# Patient Record
Sex: FEMALE | URBAN - METROPOLITAN AREA
[De-identification: names, ages, dates, MRNs, and addresses within clinical notes are randomized per-mention and may not be internally consistent; named-entity substitution may affect disease eponyms.]

---

## 2023-01-24 NOTE — PROGRESS NOTES
Brian Beach is a 26 y.o. female presents for a new pregnancy visit. Chief Complaint   Patient presents with    Initial Prenatal Visit       Problems:  denies issues      No LMP recorded (lmp unknown). Patient is pregnant. IVF baby -thinks LMP may have been in 2022,and thinks they found out about positive pregnancy Aug 2022    Last Pap: pt states she is overdue    LMP history:  The date of her LMP is not certain. IVF baby-posible sometime 2022. A urine pregnancy test was positive 2022 . She was not on the pill at conception. Based on her LMP, her EDC is 2023 and her EGA is 28 weeks,6 days. Her menstrual cycles are regular and occur approximately every 28 days  and range from 3 to 5 days. Ultrasound data:  No ultrasound done here-transferred from [facility]    Pregnancy symptoms:    Since her LMP she has experienced  urinary frequency, breast tenderness, and nausea. She has not been vomiting over the last few weeks. Associated signs and symptoms which she denies: dysuria, discharge, vaginal bleeding. She states she has gained weight:  Approximately 23 pounds since first pregnant    Relevant past pregnancy history:   She has the following pregnancy history:     She has no history of  delivery. Relevant past medical history:(relevant to this pregnancy): noncontributory. 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 1. Have you been to the ER, urgent care clinic, or hospitalized since your last visit? No    2. Have you seen or consulted any other health care providers outside of the [address] [place] [address] since your last visit?  No    Examination chaperoned by Carol Ernst RN

## 2023-01-30 ENCOUNTER — INITIAL PRENATAL (OUTPATIENT)
Dept: OBGYN CLINIC | Age: 33
End: 2023-01-30
Payer: COMMERCIAL

## 2023-01-30 VITALS
WEIGHT: 191.4 LBS | BODY MASS INDEX: 31.89 KG/M2 | SYSTOLIC BLOOD PRESSURE: 144 MMHG | DIASTOLIC BLOOD PRESSURE: 83 MMHG | HEIGHT: 65 IN

## 2023-01-30 DIAGNOSIS — R01.1 HEART MURMUR: ICD-10-CM

## 2023-01-30 DIAGNOSIS — O09.519 SUPERVISION OF PRIMIGRAVIDA OF ADVANCED MATERNAL AGE, ANTEPARTUM: Primary | ICD-10-CM

## 2023-01-30 DIAGNOSIS — Z3A.28 28 WEEKS GESTATION OF PREGNANCY: ICD-10-CM

## 2023-01-30 DIAGNOSIS — E03.9 HYPOTHYROIDISM, UNSPECIFIED TYPE: ICD-10-CM

## 2023-01-30 PROCEDURE — 0501F PRENATAL FLOW SHEET: CPT | Performed by: OBSTETRICS & GYNECOLOGY

## 2023-01-30 RX ORDER — LEVOTHYROXINE SODIUM 75 UG/1
TABLET ORAL
COMMUNITY
End: 2023-02-01 | Stop reason: SDUPTHER

## 2023-01-30 RX ORDER — HYDROCORTISONE 25 MG/ML
LOTION TOPICAL 2 TIMES DAILY
COMMUNITY

## 2023-01-30 NOTE — PROGRESS NOTES
Arleen Spatz is a 26 y.o. female presents for a new pregnancy visit. No chief complaint on file. Problems: none    LMP:    Last Pap: see report obtained {Numbers; 1-4 :[id number]} year(s) ago. LMP history:  The date of her LMP is *** certain. Her last menstrual period was normal and lasted for 4 to 5 days. A urine pregnancy test was positive *** weeks ago. She was not on the pill at conception. Based on her LMP, her EDC is *** and her EGA is *** weeks,*** days. Her menstrual cycles are regular and occur approximately every 28 days  and range from 3 to 5 days. The last menses lasted *** the usual number of days. Ultrasound data:  She had an  ultrasound done by the ultrasound tech today which revealed a viable singh pregnancy with a gestational age of *** weeks and *** days giving an EDC of ***. Pregnancy symptoms:    Since her LMP she has experienced  urinary frequency, breast tenderness, and nausea. She {HAS/HAS NOT:} been vomiting over the last few weeks. Associated signs and symptoms which she denies: dysuria, discharge, vaginal bleeding. She states she has gained weight:  Approximately 5 pounds over the last few weeks. Relevant past pregnancy history:   She has the following pregnancy history: ***    She has no history of  delivery. Relevant past medical history:(relevant to this pregnancy): noncontributory. Her occupation is: ***.     1. Have you been to the ER, urgent care clinic, or hospitalized since your last visit? {Yes when where and reason for visit:}    2. Have you seen or consulted any other health care providers outside of the [address] [place] [address] since your last visit? {Yes when where and reason for visit:}    Examination chaperoned by Izzy Vences RN.

## 2023-01-30 NOTE — PROGRESS NOTES
Current pregnancy history:    Donna Barnard is a ,  26 y.o. female UNAVAILABLE No LMP recorded (lmp unknown). Patient is pregnant. .  She presents for the evaluation of amenorrhea and a positive pregnancy test.    Per nursing Note:    LMP history:  The date of her LMP is not certain. IVF baby-posible sometime 2022. A urine pregnancy test was positive 2022 . She was not on the pill at conception. Based on her IVF transfer her EDC is 2023 and her EGA is 28 weeks,4 days per IVF docs. IVF done in [place]. She brings 2 [facility] from [facility] one which is anatomy scan - normal.  She had PGD - normal male     Pregnancy symptoms:     Since her LMP she has experienced  urinary frequency, breast tenderness, and nausea. She has not been vomiting over the last few weeks. Associated signs and symptoms which she denies: dysuria, discharge, vaginal bleeding. She states she has gained weight:  Approximately 23 pounds since first pregnant     Relevant past pregnancy history:              She has the following pregnancy history:                She has no history of  delivery. Relevant past medical history:(relevant to this pregnancy): noncontributory. Her occupation is:  for [employer] agency. Substance history: negative for alcohol, tobacco and street drugs. Positive for nothing. Exposure history: There is/are indoor cat/s in the home. The patient was instructed to not change the cat litter. She denies close contact with children on a regular basis. She has had chicken pox or the vaccine in the past.   Patient denies issues with domestic violence. Genetic Screening/Teratology Counseling: (Includes patient, baby's father, or anyone in either family with:)  3.  Patient's age >/= 28 at [facility]?-- yes   2.   Thalassemia ([place], [place], [address], or  background): MCV<80?--no.     3.  Neural tube defect (meningomyelocele, spina bifida, anencephaly)?--no.   4.  Congenital heart defect?--no.  5.  Down syndrome?--no.   6.  Bandar-Sachs (Confucianist, [place])?--no.   7.  Canavan's Disease?--no.   8.  Familial Dysautonomia?--no.   9.  Sickle cell disease or trait ()? --no   The patient has not been tested for sickle trait  10. Hemophilia or other blood disorders?--no. 11.  Muscular dystrophy?--no. 12.  Cystic fibrosis?--no. 13.  [employer] Chorea?--no. 14.  Mental retardation/autism (if yes was person tested for Fragile X)?--no. 15.  Other inherited genetic or chromosomal disorder?--no. 12.  Maternal metabolic disorder (DM, PKU, etc)?--no. 17.  Patient or FOB with a child with a birth defect not listed above?--no.  17a. Patient or FOB with a birth defect themselves?--no. 18.  Recurrent pregnancy loss, or stillbirth?--no. 19.  Any medications since LMP other than prenatal vitamins (include vitamins, supplements, OTC meds, drugs, alcohol)?--no. 20.  Any other genetic/environmental exposure to discuss?--no. Infection History:  1. Lives with someone with TB or TB exposed?--no.   2.  Patient or partner has history of genital herpes?--no.  3.  Rash or viral illness since LMP?--no.    4.  History of STD (GC, CT, HPV, syphilis, HIV)? --CT distant  5. Other: OTHER? Past Medical History:   Diagnosis Date    Anxiety     Hashimoto's thyroiditis      History reviewed. No pertinent surgical history. Social History     Occupational History    Not on file   Tobacco Use    Smoking status: Never     Passive exposure: Never    Smokeless tobacco: Never   Substance and Sexual Activity    Alcohol use: Not Currently    Drug use: Never    Sexual activity: Yes     Partners: Male     Birth control/protection: None     History reviewed. No pertinent family history.   OB History    Para Term  AB Living   1 0           SAB IAB Ectopic Molar Multiple Live Births                    # Outcome Date GA Lbr Tom/2nd Weight Sex Delivery Anes PTL Lv   1 Current              No Known Allergies  Prior to Admission medications    Medication Sig Start Date End Date Taking? Authorizing Provider   prenatal 29/EVJH fum/folic/dha (PRENATAL-1 PO) Take  by mouth. Yes Provider, Historical   CHOLINE PO Take  by mouth. Yes Provider, Historical   docosahexaenoic acid/epa (FISH OIL PO) Take  by mouth. Yes Provider, Historical   L.acidophil/L.plantar/Bifido 7 (UP4 PROBIOTICS ADULT PO) Take  by mouth. Yes Provider, Historical   levothyroxine (Synthroid) 75 mcg tablet Take  by mouth Daily (before breakfast). Yes Provider, Historical   hydrocortisone (HYTONE) 2.5 % lotion Apply  to affected area two (2) times a day.  use thin layer   Yes Provider, Historical        Review of Systems: History obtained from the patient  Constitutional: negative for weight loss, fever, night sweats  HEENT: negative for hearing loss, earache, congestion, snoring, sore throat  CV: negative for chest pain, palpitations, edema  Resp: negative for cough, shortness of breath, wheezing  Breast: negative for breast lumps, nipple discharge, galactorrhea  GI: negative for change in bowel habits, abdominal pain, black or bloody stools  : negative for frequency, dysuria, hematuria, vaginal discharge  MSK: negative for back pain, joint pain, muscle pain  Skin: negative for itching, rash, hives  Neuro: negative for dizziness, headache, confusion, weakness  Psych: negative for anxiety, depression, change in mood  Heme/lymph: negative for bleeding, bruising, pallor    Objective:  Visit Vitals  BP (!) 144/83   Ht 5' 5\" (1.651 m)   Wt 191 lb 6.4 oz (86.8 kg)   LMP  (LMP Unknown) Comment: IVF baby   BMI 31.85 kg/m²       Physical Exam:     Constitutional  Appearance: well-nourished, well developed, alert, in no acute distress    HENT  Head  Face: appears normal  Eyes: appear normal  Ears: normal  Mouth: normal  Lips: no lesions    Neck  Inspection/Palpation: normal appearance, no masses or tenderness  Lymph Nodes: no lymphadenopathy present  Thyroid: gland size normal, nontender, no nodules or masses present on palpation    Chest  Respiratory Effort: breathing unlabored  Auscultation: normal breath sounds    Cardiovascular  Heart:   Auscultation: regular rate and rhythm without murmur    Breasts  Inspection of Breasts: breasts symmetrical, no skin changes, no discharge present, nipple appearance normal, no skin retraction present  Palpation of Breasts and Axillae: no masses present on palpation, no breast tenderness  Axillary Lymph Nodes: no lymphadenopathy present    Gastrointestinal  Abdominal Examination: abdomen non-tender to palpation, normal bowel sounds, no masses present  Liver and spleen: no hepatomegaly present, spleen not palpable  Hernias: no hernias identified    Genitourinary  External Genitalia: normal appearance for age, no discharge present, no tenderness present, no inflammatory lesions present, no masses present, no atrophy present  Vagina: normal vaginal vault without central or paravaginal defects, no discharge present, no inflammatory lesions present, no masses present  Bladder: non-tender to palpation  Urethra: appears normal  Cervix: normal   Uterus: enlarged, normal shape, soft  Adnexa: no adnexal tenderness present, no adnexal masses present  Perineum: perineum within normal limits, no evidence of trauma, no rashes or skin lesions present  Anus: anus within normal limits, no hemorrhoids present  Inguinal Lymph Nodes: no lymphadenopathy present    Skin  General Inspection: no rash, no lesions identified    Neurologic/Psychiatric  Mental Status:  Orientation: grossly oriented to person, place and time  Mood and Affect: mood normal, affect appropriate    Assessment:   Intrauterine pregnancy with the following problems identified:   EDC 4/20/2023 by transfer - angel HUERTA 33 at delivery  IVF pregnancy - PGD normal  Transfer 28 weeks from [place]  ? ASHANTI  LLP - MFM  Had covid vaccine  Flu vaccine disc  PCR  Had genetics preconceptually  Hypothyroidism - TSH on synthroid 0.75  Heart murmur - cardiology - had prior to pregnancy              Plan:     Offered CF testing, CVS, Nuchal Translucency, MSAFP, amnio, and discussed NIPT  Course of pregnancy discussed including visit schedule, routine U/S, glucola testing, etc.  Avoid alcoholic beverages and illicit/recreational drugs use  Take prenatal vitamins or folic acid daily. Hospital and practice style discussed with coverage system. Discussed nutrition, toxoplasmosis precautions, sexual activity, exercise, need for influenza vaccine, environmental and work hazards, travel advice, screen for domestic violence, need for seat belts. Discussed seafood, unpasteurized dairy products, deli meat, artificial sweeteners, and caffeine. Information on prenatal classes/breastfeeding given. Information on circumcision given  Patient encouraged not to smoke. Discussed current prescription drug use. Given medication list.  Discussed the use of over the counter medications and chemicals. Route of delivery discussed, including risks, benefits, and alternatives of  versus repeat LTCS. Pt understands risk of hemorrhage during pregnancy and post delivery and would accept blood products if necessary in life-threatening emergencies    See cardiology about heart murmur - had before pregnancy  See MFM - check placenta location  See me 1 week to recheck BP    Handouts given to pt.

## 2023-01-31 LAB
ALBUMIN SERPL-MCNC: 3 G/DL (ref 3.5–5)
ALBUMIN/GLOB SERPL: 1 (ref 1.1–2.2)
ALP SERPL-CCNC: 80 U/L (ref 45–117)
ALT SERPL-CCNC: 111 U/L (ref 12–78)
ANION GAP SERPL CALC-SCNC: 4 MMOL/L (ref 5–15)
AST SERPL-CCNC: 51 U/L (ref 15–37)
BILIRUB SERPL-MCNC: 0.3 MG/DL (ref 0.2–1)
BUN SERPL-MCNC: 9 MG/DL (ref 6–20)
BUN/CREAT SERPL: 17 (ref 12–20)
CALCIUM SERPL-MCNC: 8.7 MG/DL (ref 8.5–10.1)
CHLORIDE SERPL-SCNC: 109 MMOL/L (ref 97–108)
CO2 SERPL-SCNC: 24 MMOL/L (ref 21–32)
CREAT SERPL-MCNC: 0.53 MG/DL (ref 0.55–1.02)
GLOBULIN SER CALC-MCNC: 3 G/DL (ref 2–4)
GLUCOSE 1H P 100 G GLC PO SERPL-MCNC: 124 MG/DL (ref 65–140)
GLUCOSE SERPL-MCNC: 124 MG/DL (ref 65–100)
POTASSIUM SERPL-SCNC: 3.8 MMOL/L (ref 3.5–5.1)
PROT SERPL-MCNC: 6 G/DL (ref 6.4–8.2)
SODIUM SERPL-SCNC: 137 MMOL/L (ref 136–145)

## 2023-02-01 ENCOUNTER — TELEPHONE (OUTPATIENT)
Dept: OBGYN CLINIC | Age: 33
End: 2023-02-01

## 2023-02-01 LAB
ABO GROUP BLD: NORMAL
BACTERIA UR CULT: NORMAL
BLD GP AB SCN SERPL QL: NEGATIVE
C TRACH RRNA SPEC QL NAA+PROBE: NEGATIVE
CREAT UR-MCNC: 111 MG/DL
ERYTHROCYTE [DISTWIDTH] IN BLOOD BY AUTOMATED COUNT: 12 % (ref 11.7–15.4)
HBV SURFACE AG SERPL QL IA: NEGATIVE
HCT VFR BLD AUTO: 36 % (ref 34–46.6)
HCV AB S/CO SERPL IA: <0.1 S/CO RATIO (ref 0–0.9)
HGB BLD-MCNC: 12.2 G/DL (ref 11.1–15.9)
HIV 1+2 AB+HIV1 P24 AG SERPL QL IA: NON REACTIVE
MCH RBC QN AUTO: 31.9 PG (ref 26.6–33)
MCHC RBC AUTO-ENTMCNC: 33.9 G/DL (ref 31.5–35.7)
MCV RBC AUTO: 94 FL (ref 79–97)
N GONORRHOEA RRNA SPEC QL NAA+PROBE: NEGATIVE
PLATELET # BLD AUTO: 264 X10E3/UL (ref 150–450)
PROT UR-MCNC: 16.8 MG/DL
PROT/CREAT UR: 151 MG/G CREAT (ref 0–200)
RBC # BLD AUTO: 3.82 X10E6/UL (ref 3.77–5.28)
RH BLD: POSITIVE
RUBV IGG SERPL IA-ACNC: 1.86 INDEX
T VAGINALIS RRNA SPEC QL NAA+PROBE: NEGATIVE
TREPONEMA PALLIDUM IGG+IGM AB [PRESENCE] IN SERUM OR PLASMA BY IMMUNOASSAY: NON REACTIVE
TSH SERPL DL<=0.005 MIU/L-ACNC: 1.15 UIU/ML (ref 0.45–4.5)
WBC # BLD AUTO: 7.6 X10E3/UL (ref 3.4–10.8)

## 2023-02-01 RX ORDER — LEVOTHYROXINE SODIUM 75 UG/1
75 TABLET ORAL
Qty: 90 TABLET | Refills: 3 | Status: SHIPPED | OUTPATIENT
Start: 2023-02-01

## 2023-02-02 ENCOUNTER — PATIENT MESSAGE (OUTPATIENT)
Dept: OBGYN CLINIC | Age: 33
End: 2023-02-02

## 2023-02-03 LAB
CYTOLOGIST CVX/VAG CYTO: NORMAL
CYTOLOGY CVX/VAG DOC CYTO: NORMAL
CYTOLOGY CVX/VAG DOC THIN PREP: NORMAL
DX ICD CODE: NORMAL
HPV GENOTYPE REFLEX: NORMAL
HPV I/H RISK 4 DNA CVX QL PROBE+SIG AMP: NEGATIVE
Lab: NORMAL
OTHER STN SPEC: NORMAL
STAT OF ADQ CVX/VAG CYTO-IMP: NORMAL

## 2023-02-06 ENCOUNTER — ROUTINE PRENATAL (OUTPATIENT)
Dept: OBGYN CLINIC | Age: 33
End: 2023-02-06
Payer: COMMERCIAL

## 2023-02-06 VITALS — BODY MASS INDEX: 32.18 KG/M2 | WEIGHT: 193.4 LBS | SYSTOLIC BLOOD PRESSURE: 121 MMHG | DIASTOLIC BLOOD PRESSURE: 82 MMHG

## 2023-02-06 DIAGNOSIS — Z3A.29 29 WEEKS GESTATION OF PREGNANCY: ICD-10-CM

## 2023-02-06 DIAGNOSIS — Z23 ENCOUNTER FOR IMMUNIZATION: ICD-10-CM

## 2023-02-06 DIAGNOSIS — O09.519 SUPERVISION OF PRIMIGRAVIDA OF ADVANCED MATERNAL AGE, ANTEPARTUM: Primary | ICD-10-CM

## 2023-02-06 PROCEDURE — 90471 IMMUNIZATION ADMIN: CPT | Performed by: OBSTETRICS & GYNECOLOGY

## 2023-02-06 PROCEDURE — 0502F SUBSEQUENT PRENATAL CARE: CPT

## 2023-02-06 PROCEDURE — 90715 TDAP VACCINE 7 YRS/> IM: CPT

## 2023-02-06 NOTE — PROGRESS NOTES
BP better today  LFT slightly elevated at last visit - needs repeat but too late in the day - will do next week  Baby moving  tdap today

## 2023-02-09 ENCOUNTER — HOSPITAL ENCOUNTER (OUTPATIENT)
Dept: PERINATAL CARE | Age: 33
Discharge: HOME | End: 2023-02-09
Attending: OBSTETRICS & GYNECOLOGY
Payer: COMMERCIAL

## 2023-02-09 PROCEDURE — 76805 OB US >/= 14 WKS SNGL FETUS: CPT | Performed by: OBSTETRICS & GYNECOLOGY

## 2023-02-14 ENCOUNTER — ROUTINE PRENATAL (OUTPATIENT)
Dept: OBGYN CLINIC | Age: 33
End: 2023-02-14
Payer: COMMERCIAL

## 2023-02-14 VITALS — WEIGHT: 192 LBS | SYSTOLIC BLOOD PRESSURE: 101 MMHG | DIASTOLIC BLOOD PRESSURE: 70 MMHG | BODY MASS INDEX: 31.95 KG/M2

## 2023-02-14 DIAGNOSIS — Z3A.30 30 WEEKS GESTATION OF PREGNANCY: ICD-10-CM

## 2023-02-14 DIAGNOSIS — O09.519 SUPERVISION OF PRIMIGRAVIDA OF ADVANCED MATERNAL AGE, ANTEPARTUM: Primary | ICD-10-CM

## 2023-02-14 DIAGNOSIS — R79.89 ELEVATED LFTS: ICD-10-CM

## 2023-02-14 PROCEDURE — 0502F SUBSEQUENT PRENATAL CARE: CPT | Performed by: OBSTETRICS & GYNECOLOGY

## 2023-02-14 NOTE — PROGRESS NOTES
Placenta 1.8cm from internal os last week - needs to be at least 2cm  Baby moving  No bleeding   Advice given for GERD  Repeat labs today    Urine very concentrated

## 2023-02-14 NOTE — PROGRESS NOTES
EDC 4/20/2023 by transfer Sweetie Reid  AMA 33 at delivery  IVF pregnancy - PGD normal  Transfer 28 weeks from [place]  ? [facility]  Post previa at 36 weeks (1.8 cm)  Had covid vaccine  Flu vaccine  Had genetics preconceptually  Hypothyroidism - TSH on synthroid 0.75  TSH 1.150  Heart murmur - cardiology  PCR baseline . 15   LFT elevated - repeat next visit   TDAP administered 2/6/23  1 hour

## 2023-02-15 LAB
ALBUMIN SERPL-MCNC: 4 G/DL (ref 3.8–4.8)
ALBUMIN/GLOB SERPL: 2.2 {RATIO} (ref 1.2–2.2)
ALP SERPL-CCNC: 94 IU/L (ref 44–121)
ALT SERPL-CCNC: 45 IU/L (ref 0–32)
AST SERPL-CCNC: 26 IU/L (ref 0–40)
BILIRUB SERPL-MCNC: 0.2 MG/DL (ref 0–1.2)
BUN SERPL-MCNC: 10 MG/DL (ref 6–20)
BUN/CREAT SERPL: 18 (ref 9–23)
CALCIUM SERPL-MCNC: 9.4 MG/DL (ref 8.7–10.2)
CHLORIDE SERPL-SCNC: 102 MMOL/L (ref 96–106)
CO2 SERPL-SCNC: 21 MMOL/L (ref 20–29)
CREAT SERPL-MCNC: 0.57 MG/DL (ref 0.57–1)
EGFRCR SERPLBLD CKD-EPI 2021: 121 ML/MIN/1.73
GLOBULIN SER CALC-MCNC: 1.8 G/DL (ref 1.5–4.5)
GLUCOSE SERPL-MCNC: 80 MG/DL (ref 70–99)
POTASSIUM SERPL-SCNC: 4.1 MMOL/L (ref 3.5–5.2)
PROT SERPL-MCNC: 5.8 G/DL (ref 6–8.5)
SODIUM SERPL-SCNC: 137 MMOL/L (ref 134–144)

## 2023-02-22 ENCOUNTER — ROUTINE PRENATAL (OUTPATIENT)
Dept: OBGYN CLINIC | Age: 33
End: 2023-02-22
Payer: COMMERCIAL

## 2023-02-22 VITALS — WEIGHT: 197.4 LBS | BODY MASS INDEX: 32.85 KG/M2 | SYSTOLIC BLOOD PRESSURE: 124 MMHG | DIASTOLIC BLOOD PRESSURE: 81 MMHG

## 2023-02-22 DIAGNOSIS — O09.519 SUPERVISION OF PRIMIGRAVIDA OF ADVANCED MATERNAL AGE, ANTEPARTUM: Primary | ICD-10-CM

## 2023-02-22 DIAGNOSIS — O44.03 PLACENTA PREVIA IN THIRD TRIMESTER: ICD-10-CM

## 2023-02-22 DIAGNOSIS — Z3A.31 31 WEEKS GESTATION OF PREGNANCY: ICD-10-CM

## 2023-02-22 PROCEDURE — 0502F SUBSEQUENT PRENATAL CARE: CPT | Performed by: OBSTETRICS & GYNECOLOGY

## 2023-02-22 NOTE — PROGRESS NOTES
EDC 4/20/2023 by transfer Carmelina Tobias  AMA 33 at delivery  IVF pregnancy - PGD normal  Transfer 28 weeks from [place]  ? [facility]  Post previa at 36 weeks (1.8 cm)  Had covid vaccine  Flu vaccine  Had genetics preconceptually  Hypothyroidism - TSH on synthroid 0.75  TSH 1.150  Heart murmur - cardiology  PCR baseline . 15   LFT elevated - repeat next visit   TDAP administered 2/6/23  1 hour

## 2023-02-27 ENCOUNTER — OFFICE VISIT (OUTPATIENT)
Dept: CARDIOLOGY CLINIC | Age: 33
End: 2023-02-27

## 2023-02-27 ENCOUNTER — ROUTINE PRENATAL (OUTPATIENT)
Dept: OBGYN CLINIC | Age: 33
End: 2023-02-27

## 2023-02-27 ENCOUNTER — TELEPHONE (OUTPATIENT)
Dept: OBGYN CLINIC | Age: 33
End: 2023-02-27

## 2023-02-27 VITALS
BODY MASS INDEX: 33.32 KG/M2 | HEIGHT: 65 IN | OXYGEN SATURATION: 98 % | DIASTOLIC BLOOD PRESSURE: 80 MMHG | HEART RATE: 103 BPM | WEIGHT: 200 LBS | SYSTOLIC BLOOD PRESSURE: 132 MMHG

## 2023-02-27 DIAGNOSIS — R01.1 MURMUR, CARDIAC: ICD-10-CM

## 2023-02-27 DIAGNOSIS — Z76.89 ESTABLISHING CARE WITH NEW DOCTOR, ENCOUNTER FOR: Primary | ICD-10-CM

## 2023-02-27 DIAGNOSIS — Z3A.32 32 WEEKS GESTATION OF PREGNANCY: ICD-10-CM

## 2023-02-27 PROCEDURE — 99204 OFFICE O/P NEW MOD 45 MIN: CPT | Performed by: INTERNAL MEDICINE

## 2023-02-27 PROCEDURE — 93000 ELECTROCARDIOGRAM COMPLETE: CPT | Performed by: INTERNAL MEDICINE

## 2023-02-27 NOTE — PROGRESS NOTES
Reason to see patient: PreEclampsia    Referring: None    HPI: Harsha Galindo is a 26 y.o. female with past medical history significant for Hashimoto's thyroiditis is here. She is 32 weeks pregnant. On last few occasions she has had her blood pressure which was slightly elevated at her OB appointments. Her max blood pressure noted in 140 range. Last 3 readings in past 3 weeks have been fairly normal.  Out of concern she was asked to follow-up. Also she has been noticed to have a murmur. Currently has symptoms of shortness of breath on mild-to-moderate exertion. No symptoms of chest pain, rotations or lightheadedness or dizziness. Recently she started having symptoms of right upper quadrant abdominal pain for which she is going to have further evaluation today. No previous history of hypertension. Family history significant for father having coronary artery disease and myocardial infarction, mother has hypertension. EKG in my office today demonstrated mild sinus tachycardia with normal axis with short MS interval with normal ST segments and normal intervals. Lab results were personally reviewed. Plan:    1. Possible preeclampsia versus random episodes of slightly high blood pressure: At this time we will continue to monitor with weekly OB appointments and blood pressure checks. At this time I do not feel we need to give her any medications. 2.  Murmur: We will check echocardiogram.        ATTENTION:   This medical record was transcribed using an electronic medical records/speech recognition system. Although proofread, it may and can contain electronic, spelling and other errors. Corrections may be executed at a later time. Please feel free to contact us for any clarifications as needed. Past Medical History:   Diagnosis Date    Anxiety     Hashimoto's thyroiditis     Routine Papanicolaou smear 01/30/2023    NILM, HPV negative            No past surgical history on file. Family History   Problem Relation Age of Onset    Hypertension Mother     Other Father     Heart Attack Father     Other Maternal Grandmother     Other Maternal Grandfather     Stroke Maternal Grandfather     Heart Disease Maternal Grandfather     Diabetes Paternal Grandmother     Anxiety Paternal Grandmother     Other Paternal Grandfather     Cancer Paternal Grandfather            Social History     Socioeconomic History    Marital status:      Spouse name: Not on file    Number of children: Not on file    Years of education: Not on file    Highest education level: Not on file   Occupational History    Not on file   Tobacco Use    Smoking status: Never     Passive exposure: Never    Smokeless tobacco: Never   Substance and Sexual Activity    Alcohol use: Not Currently    Drug use: Never    Sexual activity: Yes     Partners: Male     Birth control/protection: None   Other Topics Concern    Not on file   Social History Narrative    Not on file     Social Determinants of Health     Financial Resource Strain: Not on file   Food Insecurity: Not on file   Transportation Needs: Not on file   Physical Activity: Not on file   Stress: Not on file   Social Connections: Not on file   Intimate Partner Violence: Not on file   Housing Stability: Not on file         No Known Allergies         Current Outpatient Medications   Medication Sig Dispense Refill    levothyroxine (Synthroid) 75 mcg tablet Take 1 Tablet by mouth Daily (before breakfast). 90 Tablet 3    CHOLINE PO Take  by mouth. docosahexaenoic acid/epa (FISH OIL PO) Take  by mouth. L.acidophil/L.plantar/Bifido 7 (UP4 PROBIOTICS ADULT PO) Take  by mouth. hydrocortisone (HYTONE) 2.5 % lotion Apply  to affected area two (2) times a day. use thin layer      prenatal vit-iron fumarate-fa 27 mg iron- 0.8 mg tab tablet Take 1 Tablet by mouth daily. 90 Tablet 5        ROS:  12 point review of systems was performed.  All negative except for HPI     Physical Exam:  Visit Vitals  /80 (BP 1 Location: Left upper arm, BP Patient Position: Sitting)   Pulse (!) 103   Ht 5' 5\" (1.651 m)   Wt 200 lb (90.7 kg)   LMP  (LMP Unknown) Comment: IVF baby   SpO2 98%   BMI 33.28 kg/m²       Gen:  Well-developed, well-nourished, in no acute distress  HEENT:  Pink conjunctivae, PERRL, hearing intact to voice, moist mucous membranes  Neck:  Supple, without masses, thyroid non-tender  Resp:  No accessory muscle use, clear breath sounds without wheezes rales or rhonchi  Card:  No murmurs, normal S1, S2 without thrills, bruits or peripheral edema  Abd:  Soft, non-tender, non-distended, normoactive bowel sounds are present, no palpable organomegaly and no detectable hernias  Lymph:  No cervical or inguinal adenopathy  Musc:  No cyanosis or clubbing  Skin:  No rashes or ulcers, skin turgor is good  Neuro:  Cranial nerves are grossly intact, no focal motor weakness, follows commands appropriately  Psych:  Good insight, oriented to person, place and time, alert     Labs:     Lab Results   Component Value Date/Time    WBC 7.6 01/30/2023 12:00 AM    HGB 12.2 01/30/2023 12:00 AM    HCT 36.0 01/30/2023 12:00 AM    PLATELET 130 87/44/8900 12:00 AM    MCV 94 01/30/2023 12:00 AM     Lab Results   Component Value Date/Time    Glucose 80 02/14/2023 12:00 AM    Creatinine 0.57 02/14/2023 12:00 AM      No results found for: CHOL, CHOLPOCT, HDL, LDL, LDLC, LDLCPOC, LDLCEXT, TRIGL, TGLPOCT, CHHD, CHHDX  Lab Results   Component Value Date/Time    ALT (SGPT) 45 (H) 02/14/2023 12:00 AM    Alk.  phosphatase 94 02/14/2023 12:00 AM    Bilirubin, total 0.2 02/14/2023 12:00 AM    Albumin 4.0 02/14/2023 12:00 AM    Protein, total 5.8 (L) 02/14/2023 12:00 AM    PLATELET 783 34/38/8953 12:00 AM     No results found for: INR, INREXT, PTMR, PTP, PT1, PT2, INREXT   Lab Results   Component Value Date/Time    Creatinine 0.57 02/14/2023 12:00 AM    BUN 10 02/14/2023 12:00 AM    Sodium 137 02/14/2023 12:00 AM    Potassium 4.1 02/14/2023 12:00 AM    Chloride 102 02/14/2023 12:00 AM    CO2 21 02/14/2023 12:00 AM     No results found for: PSA, PSA2, PSAR1, PSA1, PSAR2, PSA3, PSAR3, [id number], [id number]  Lab Results   Component Value Date/Time    TSH 1.150 01/30/2023 12:00 AM      Lab Results   Component Value Date/Time    Glucose 80 02/14/2023 12:00 AM      No results found for: CPK, RCK1, RCK2, RCK3, RCK4, CKMB, CKNDX, CKND1, TROPT, TROIQ, BNPP, BNP   No results found for: BNP, BNPP, BNPPPOC, XBNPT, BNPNT   Lab Results   Component Value Date/Time    Sodium 137 02/14/2023 12:00 AM    Potassium 4.1 02/14/2023 12:00 AM    Chloride 102 02/14/2023 12:00 AM    CO2 21 02/14/2023 12:00 AM    Anion gap 4 (L) 01/30/2023 11:21 AM    Glucose 80 02/14/2023 12:00 AM    BUN 10 02/14/2023 12:00 AM    Creatinine 0.57 02/14/2023 12:00 AM    BUN/Creatinine ratio 18 02/14/2023 12:00 AM    Calcium 9.4 02/14/2023 12:00 AM      Lab Results   Component Value Date/Time    Sodium 137 02/14/2023 12:00 AM    Potassium 4.1 02/14/2023 12:00 AM    Chloride 102 02/14/2023 12:00 AM    CO2 21 02/14/2023 12:00 AM    Anion gap 4 (L) 01/30/2023 11:21 AM    Glucose 80 02/14/2023 12:00 AM    BUN 10 02/14/2023 12:00 AM    Creatinine 0.57 02/14/2023 12:00 AM    BUN/Creatinine ratio 18 02/14/2023 12:00 AM    Calcium 9.4 02/14/2023 12:00 AM    Bilirubin, total 0.2 02/14/2023 12:00 AM    ALT (SGPT) 45 (H) 02/14/2023 12:00 AM    Alk. phosphatase 94 02/14/2023 12:00 AM    Protein, total 5.8 (L) 02/14/2023 12:00 AM    Albumin 4.0 02/14/2023 12:00 AM    Globulin 3.0 01/30/2023 11:21 AM    A-G Ratio 2.2 02/14/2023 12:00 AM      No results found for: HBA1C, QUE8KLJG, MDF9VQEJ, TDE8WGEX      No results for input(s): CPK, CKMB, TROIQ in the last 72 hours.     No lab exists for component: CKQMB, CPKMB        Problem List:     Problem List  Date Reviewed: 2/22/2023            Codes Class Noted    Supervision of primigravida of advanced maternal age, antepartum ICD-10-CM: O09.519  ICD-9-CM: V23.81  1/30/2023    Overview Addendum 2/22/2023  3:41 PM by Charisma Schaefer MD     [facility]  AMAYAKA 26 at delivery  IVF pregnancy - PGD normal  Transfer 28 weeks from [place]  ? [facility]  Post previa at 30 weeks (1.8 cm)  Had covid vaccine  Flu vaccine  Had genetics preconceptually  Hypothyroidism - TSH on synthroid 0.75  TSH 1.150  Heart murmur - cardiology  PCR baseline . 15   LFT elevated - repeat next visit   TDAP administered 2/6/23  1 hour                       Herbie Herrera MD, [facility]

## 2023-02-27 NOTE — TELEPHONE ENCOUNTER
Pt last seen in office 2/22/23 c/o having intense URQ pain. She believes it could be related to gallstones or gallbladder. She states she's on the way to her Cardiologist appt and wanted to know if she needed to stop by OB-GYN office to be seen. Denies having a personal hx but has fam hx of gallbladder issues. This MA s/w MD who states she needs to come by the office after her appt with Cardiologist, before 4pm.     Pt verbalized understanding.

## 2023-02-27 NOTE — PROGRESS NOTES
Spoke with pt to let her know Dr. Garo España was doing a delivery. Pt states she had wanted an ultrasound for what she thinks is gallstones. She has a routine OB appt this Friday and is going to stop at the desk to see if she can come tomorrow to talk with Dr. Garo España . She said she will head home and watch what she eats. Just noted that pt made appt for tomorrow 0800 am to be seen. Dr. Garo España made aware. Pt instructed to call on call MD if pain worsens,or has any vaginal bleeding,sudden gush of fluid etc or head to ER. Pt verbalized understanding and states she plans to go home and rest

## 2023-02-27 NOTE — PROGRESS NOTES
Chief Complaint   Patient presents with    Follow-up     palp     Vitals:    02/27/23 1449   BP: 132/80   BP 1 Location: Left upper arm   BP Patient Position: Sitting   Pulse: (!) 103   Height: 5' 5\" (1.651 m)   Weight: 200 lb (90.7 kg)   SpO2: 98%       Chest pain denied     SOB denied     Palpitations - yes     Swelling in hands/feet denied     Dizziness denied     Recent hospital stays denied     Refills denied

## 2023-02-28 ENCOUNTER — ROUTINE PRENATAL (OUTPATIENT)
Dept: OBGYN CLINIC | Age: 33
End: 2023-02-28
Payer: COMMERCIAL

## 2023-02-28 VITALS — DIASTOLIC BLOOD PRESSURE: 78 MMHG | SYSTOLIC BLOOD PRESSURE: 123 MMHG | WEIGHT: 196.4 LBS | BODY MASS INDEX: 32.68 KG/M2

## 2023-02-28 DIAGNOSIS — R10.11 RUQ PAIN: ICD-10-CM

## 2023-02-28 DIAGNOSIS — O09.519 SUPERVISION OF PRIMIGRAVIDA OF ADVANCED MATERNAL AGE, ANTEPARTUM: Primary | ICD-10-CM

## 2023-02-28 PROCEDURE — 0502F SUBSEQUENT PRENATAL CARE: CPT | Performed by: OBSTETRICS & GYNECOLOGY

## 2023-02-28 NOTE — PROGRESS NOTES
EDC 4/20/2023 by transfer Johnie Headhold  AMAYAKA 33 at delivery  IVF pregnancy - PGD normal  Transfer 28 weeks from [place]  ? CHTN  Post previa at 30 weeks (1.8 cm)  Had covid vaccine  Flu vaccine  Had genetics preconceptually  Hypothyroidism - TSH on synthroid 0.75  TSH 1.150  Heart murmur - cardiology  PCR baseline . 15   LFT elevated - repeat next visit   TDAP administered 2/6/23  1 hour

## 2023-02-28 NOTE — PROGRESS NOTES
EXTRA VISIT    Complains of RUQ discomfort, worse after eating big meal. Radiates to back  Baby moving    Area nontender to palpation    Note her LFT were mildly elevated a few weeks ago before all this started, then trended downward again  Repeat all labs with amylase, lipase  Needs RUQ US

## 2023-03-01 ENCOUNTER — HOSPITAL ENCOUNTER (OUTPATIENT)
Dept: ULTRASOUND IMAGING | Age: 33
Discharge: HOME | End: 2023-03-01
Attending: OBSTETRICS & GYNECOLOGY
Payer: COMMERCIAL

## 2023-03-01 DIAGNOSIS — R10.11 RUQ PAIN: ICD-10-CM

## 2023-03-01 DIAGNOSIS — O09.519 SUPERVISION OF PRIMIGRAVIDA OF ADVANCED MATERNAL AGE, ANTEPARTUM: ICD-10-CM

## 2023-03-01 LAB
ALBUMIN SERPL-MCNC: 2.8 G/DL (ref 3.5–5)
ALBUMIN/GLOB SERPL: 0.9 (ref 1.1–2.2)
ALP SERPL-CCNC: 103 U/L (ref 45–117)
ALT SERPL-CCNC: 43 U/L (ref 12–78)
AMYLASE SERPL-CCNC: 53 U/L (ref 25–115)
ANION GAP SERPL CALC-SCNC: 10 MMOL/L (ref 5–15)
AST SERPL-CCNC: 24 U/L (ref 15–37)
BILIRUB SERPL-MCNC: 0.3 MG/DL (ref 0.2–1)
BUN SERPL-MCNC: 7 MG/DL (ref 6–20)
BUN/CREAT SERPL: 13 (ref 12–20)
CALCIUM SERPL-MCNC: 9.1 MG/DL (ref 8.5–10.1)
CHLORIDE SERPL-SCNC: 107 MMOL/L (ref 97–108)
CO2 SERPL-SCNC: 24 MMOL/L (ref 21–32)
CREAT SERPL-MCNC: 0.56 MG/DL (ref 0.55–1.02)
ERYTHROCYTE [DISTWIDTH] IN BLOOD BY AUTOMATED COUNT: 13.3 % (ref 11.5–14.5)
GLOBULIN SER CALC-MCNC: 3 G/DL (ref 2–4)
GLUCOSE SERPL-MCNC: 75 MG/DL (ref 65–100)
HCT VFR BLD AUTO: 38.5 % (ref 35–47)
HGB BLD-MCNC: 12.2 G/DL (ref 11.5–16)
LIPASE SERPL-CCNC: 131 U/L (ref 73–393)
MCH RBC QN AUTO: 31.6 PG (ref 26–34)
MCHC RBC AUTO-ENTMCNC: 31.7 G/DL (ref 30–36.5)
MCV RBC AUTO: 99.7 FL (ref 80–99)
NRBC # BLD: 0 K/UL (ref 0–0.01)
NRBC BLD-RTO: 0 PER 100 WBC
PLATELET # BLD AUTO: 232 K/UL (ref 150–400)
PMV BLD AUTO: 10.1 FL (ref 8.9–12.9)
POTASSIUM SERPL-SCNC: 4.1 MMOL/L (ref 3.5–5.1)
PROT SERPL-MCNC: 5.8 G/DL (ref 6.4–8.2)
RBC # BLD AUTO: 3.86 M/UL (ref 3.8–5.2)
SODIUM SERPL-SCNC: 141 MMOL/L (ref 136–145)
WBC # BLD AUTO: 8.4 K/UL (ref 3.6–11)

## 2023-03-01 PROCEDURE — 76705 ECHO EXAM OF ABDOMEN: CPT

## 2023-03-14 ENCOUNTER — ROUTINE PRENATAL (OUTPATIENT)
Dept: OBGYN CLINIC | Age: 33
End: 2023-03-14
Payer: COMMERCIAL

## 2023-03-14 VITALS — DIASTOLIC BLOOD PRESSURE: 80 MMHG | WEIGHT: 195.4 LBS | SYSTOLIC BLOOD PRESSURE: 120 MMHG | BODY MASS INDEX: 32.52 KG/M2

## 2023-03-14 DIAGNOSIS — O09.519 SUPERVISION OF PRIMIGRAVIDA OF ADVANCED MATERNAL AGE, ANTEPARTUM: Primary | ICD-10-CM

## 2023-03-14 PROCEDURE — 0502F SUBSEQUENT PRENATAL CARE: CPT | Performed by: OBSTETRICS & GYNECOLOGY

## 2023-03-14 RX ORDER — FAMOTIDINE 20 MG/1
20 TABLET, FILM COATED ORAL 2 TIMES DAILY
COMMUNITY

## 2023-03-14 NOTE — PROGRESS NOTES
Baby moving  Still has pain RUQ - nearly constant.  Labs/RUQ US normal  Having sig issues with carpel tunnel - christopher on right - advised to see ortho hand    Fu 2 weeks  Has MFM fu next week

## 2023-03-14 NOTE — PROGRESS NOTES
EDC 4/20/2023 by transfer Sukh Hurst  AMAYAKA 33 at delivery  IVF pregnancy - PGD normal  Transfer 28 weeks from [place]  ? CHTN  Post previa at 30 weeks (1.8 cm)  Had covid vaccine  Flu vaccine  Had genetics preconceptually  Hypothyroidism - TSH on synthroid 0.75  TSH 1.150  Heart murmur - cardiology  PCR baseline . 15   LFT elevated - repeat next visit   TDAP administered 2/6/23  1 hour

## 2023-03-23 ENCOUNTER — HOSPITAL ENCOUNTER (OUTPATIENT)
Dept: PERINATAL CARE | Age: 33
Discharge: HOME | End: 2023-03-23
Attending: OBSTETRICS & GYNECOLOGY
Payer: COMMERCIAL

## 2023-03-23 PROCEDURE — 76816 OB US FOLLOW-UP PER FETUS: CPT | Performed by: OBSTETRICS & GYNECOLOGY

## 2023-03-23 PROCEDURE — 76817 TRANSVAGINAL US OBSTETRIC: CPT | Performed by: OBSTETRICS & GYNECOLOGY

## 2023-03-23 PROCEDURE — 76819 FETAL BIOPHYS PROFIL W/O NST: CPT | Performed by: OBSTETRICS & GYNECOLOGY

## 2023-03-24 NOTE — PROCEDURES
Indication  ========    Advanced maternal age  Pregnancy via IVF  Low lying placenta    Method  ======    Transabdominal ultrasound examination. View: Suboptimal view: limited by late gestational age    Pregnancy  =========    Augustine pregnancy. Number of fetuses: 1    Dating  ======    Prior assessment by: by IVF dating  GA by prior assessment 39 w + 0 d  TALI by prior assessment: 4/20/2023  Ultrasound examination on: 3/23/2023  GA by U/S based upon: [facility], BPD, Femur, HC  GA by U/S 40 w + 4 d  TALI by U/S: 4/9/2023  Assigned: based on stated TALI (by IVF dating), selected on 02/9/2023  Assigned GA 36 w + 0 d  Assigned TALI: 4/20/2023    Fetal Biometry  ============    Standard  BPD 92.7 mm 37w 5d 93% Hadlock  .3 mm -/- >99% Reece  .9 mm 39w 2d 90% Hadlock  .1 mm 37w 4d 93% Hadlock  Femur 69.9 mm 35w 6d 41% Hadlock  EFW 3,195 g 37w 6d 85% Hadlock  EFW (lb) 7 lb  EFW (oz) 1 oz  EFW by: Hadlock (BPD-HC-AC-FL)  Extended   5.4 mm  Other Structures   bpm    General Evaluation  ==============    Cardiac activity present.  bpm. Fetal movements: visualized. Presentation: cephalic  Placenta: posterior placental is 3.5 cm away from internal cervical os  Umbilical cord: 3 vessel cord  Amniotic fluid: MVP 5.9 cm. ANNETTA 12.2 cm.  Q1 5.9 cm, Q2 2.9 cm, Q3 3.4 cm, Q4 0.0 cm    Fetal Anatomy  ===========    Lateral ventricles: normal  Choroid plexus: normal  Midline falx: normal  Stomach: normal  Kidneys: normal  Bladder: normal  Fetal sex: female  Wants to know fetal sex: yes    Maternal Structures  ===============    Uterus / Cervix  Cervix: Visualized  Cervix details: normal  Approach: Transvaginal  Cervical length 4.62 cm  Second approach: Transabdominal    Biophysical Profile  ==============    2: Fetal breathing movements  2: Gross body movements  2: Fetal tone  2: Amniotic fluid volume  8/8 Biophysical profile score    Findings  =======    Follow up ultrasound (19734)    This is a augustine pregnancy in vertex position with biometry consistent with prior dating. Anatomy appears normal as noted above. Normal fluid and fetal movement are  noted. Growth is appropriate for gestational age with EFW = 3195g at 85% [facility] SCREVEN = 93%). Transvaginal ultrasound 99919    Transvaginal ultrasound was performed to assess placental location of which views were limited abdominally: posterior placenta is not low-lying at 3.5cm from the os    Biophysical Profile without NST (04844)    Please see biophysical profile score noted above. Fetal movement and fluid volume appear normal.    Plan of Care  ==========    This is an IVF pregnancy. Francia Doyle did not have a fetal echocardiogram performed during this pregnancy. We discussed the increased risk for congential cardiac  abnormalities in pregnancies conceived via IVF. I will refer Francia Doyle for a fetal echocardiogram.    The placenta should be more than 2 cm from the cervical os for a vaginal delivery. I will have Francia Doyle return in 6 weeks to evaluate placental location. Follow-up  ========    Weekly BPPs may be done at our office or yours    Coding  ======    Code: O09.513  Description: Supervision of elderly primigravida  Code: O200. 8XX  Description: Pregnancy via IVF  Code: Z03.72  Description: Encounter for suspected placental problem ruled out  Code: 15534  Description: Ultrasound, pregnant uterus, real time with image documentation, follow up, transabdominal approach per fetus  Code: 68447  Description: Fetal biophysical profile; without non-stress testing  Code: 35178  Description: Ultrasound, pregnant uterus, real time with image documentation, transvaginal

## 2023-03-29 ENCOUNTER — ROUTINE PRENATAL (OUTPATIENT)
Dept: OBGYN CLINIC | Age: 33
End: 2023-03-29
Payer: COMMERCIAL

## 2023-03-29 VITALS — BODY MASS INDEX: 32.82 KG/M2 | DIASTOLIC BLOOD PRESSURE: 83 MMHG | SYSTOLIC BLOOD PRESSURE: 119 MMHG | WEIGHT: 197.2 LBS

## 2023-03-29 DIAGNOSIS — Z36.85 ANTENATAL SCREENING FOR STREPTOCOCCUS B: ICD-10-CM

## 2023-03-29 DIAGNOSIS — Z3A.36 36 WEEKS GESTATION OF PREGNANCY: ICD-10-CM

## 2023-03-29 DIAGNOSIS — Z78.9 CONCEIVED BY IN VITRO FERTILIZATION: ICD-10-CM

## 2023-03-29 DIAGNOSIS — O09.519 SUPERVISION OF PRIMIGRAVIDA OF ADVANCED MATERNAL AGE, ANTEPARTUM: Primary | ICD-10-CM

## 2023-03-29 PROCEDURE — 0502F SUBSEQUENT PRENATAL CARE: CPT | Performed by: OBSTETRICS & GYNECOLOGY

## 2023-03-29 NOTE — PROGRESS NOTES
EDC 4/20/2023 by transfer Virginia No  AMA 33 at delivery  IVF pregnancy - PGD normal  Transfer 28 weeks from [place]  ? CHTN  Post previa at 30 weeks (1.8 cm)  Had covid vaccine  Flu vaccine  Had genetics preconceptually  Hypothyroidism - TSH on synthroid 0.75  TSH 1.150  Heart murmur - cardiology  PCR baseline . 15   LFT elevated - repeat next visit

## 2023-03-29 NOTE — PROGRESS NOTES
Placenta moved on US at [place] [address] - head measurements 90-93%, EFW 7-1  recommend weekly BPP due to hx of IVF    Cervix closed with vtx above SP - outlet very tight  Patient wants to post CS - she has been considering.  Can always cancel if head engages

## 2023-03-31 ENCOUNTER — ROUTINE PRENATAL (OUTPATIENT)
Dept: OBGYN CLINIC | Age: 33
End: 2023-03-31

## 2023-03-31 VITALS — DIASTOLIC BLOOD PRESSURE: 86 MMHG | SYSTOLIC BLOOD PRESSURE: 124 MMHG | BODY MASS INDEX: 32.68 KG/M2 | WEIGHT: 196.4 LBS

## 2023-03-31 DIAGNOSIS — Z3A.37 37 WEEKS GESTATION OF PREGNANCY: ICD-10-CM

## 2023-03-31 DIAGNOSIS — Z78.9 CONCEIVED BY IN VITRO FERTILIZATION: ICD-10-CM

## 2023-03-31 DIAGNOSIS — O09.519 SUPERVISION OF PRIMIGRAVIDA OF ADVANCED MATERNAL AGE, ANTEPARTUM: Primary | ICD-10-CM

## 2023-03-31 NOTE — PROGRESS NOTES
EDC 4/20/2023 by transfer Adria Ga  AMA 33 at delivery  IVF pregnancy - PGD normal  Transfer 28 weeks from [place]  ? CHTN  Post previa at 30 weeks (1.8 cm)  Had covid vaccine  Flu vaccine  Had genetics preconceptually  Hypothyroidism - TSH on synthroid 0.75  TSH 1.150  Heart murmur - cardiology  PCR baseline . 15   LFT elevated - repeat next visit   Primary C/S 4/14/23

## 2023-04-01 LAB — B-HEM STREP SPEC QL CULT: POSITIVE

## 2023-04-05 ENCOUNTER — TELEPHONE (OUTPATIENT)
Dept: OBGYN CLINIC | Age: 33
End: 2023-04-05

## 2023-04-05 NOTE — TELEPHONE ENCOUNTER
Two patient identifiers used    26year old patient  37w6d pregnant    Patient calling about her FMLA forms and was advised they were completed and faxed on 3/22/2023 to her disability company     Patient will contact the company to check on the status of the forms and and seen a my chart message to provider for further information about completion of the forms    Patient verbalized understanding.

## 2023-04-06 ENCOUNTER — ROUTINE PRENATAL (OUTPATIENT)
Dept: OBGYN CLINIC | Age: 33
End: 2023-04-06

## 2023-04-06 PROCEDURE — 0502F SUBSEQUENT PRENATAL CARE: CPT | Performed by: OBSTETRICS & GYNECOLOGY

## 2023-04-06 NOTE — PROGRESS NOTES
Problem List  Date Reviewed: 3/31/2023          Codes Class Noted    Murmur, cardiac ICD-10-CM: R01.1  ICD-9-CM: 785.2  2/27/2023        32 weeks gestation of pregnancy ICD-10-CM: Z3A.32  ICD-9-CM: V22.2  2/27/2023        Supervision of primigravida of advanced maternal age, antepartum ICD-10-CM: O09.519  ICD-9-CM: V23.81  1/30/2023    Overview Addendum 3/29/2023  1:41 PM by Claire Powell     [facility] 4/20/2023 by transfer [place]  AMAYAKA 33 at delivery  IVF pregnancy - PGD normal  Transfer 28 weeks from [place]  ? CHTN  Post previa at 30 weeks (1.8 cm)  Had covid vaccine  Flu vaccine  Had genetics preconceptually  Hypothyroidism - TSH on synthroid 0.75  TSH 1.150  Heart murmur - cardiology  PCR baseline . 15   LFT elevated - repeat next visit   Primary C/S 4/14/23

## 2023-04-06 NOTE — PROGRESS NOTES
BPP  A SINGLE VERTEX 38W0D IUP IS SEEN. FETAL CARDIAC MOTION OBSERVED. ANNETTA AND PLACENTA APPEAR WITHIN NORMAL LIMITS.   BPP SCORE: 8/8

## 2023-04-11 PROBLEM — O16.9 HYPERTENSION AFFECTING PREGNANCY: Status: ACTIVE | Noted: 2023-04-11

## 2023-04-18 ENCOUNTER — TELEPHONE (OUTPATIENT)
Dept: OBGYN CLINIC | Age: 33
End: 2023-04-18

## 2023-04-18 NOTE — TELEPHONE ENCOUNTER
Two patient identifiers used    26year old [address] patient that delivered by Walterine Viji on 4/13/2023    Patient calling to say that for the past 36 hours she has been having increasing pain when she urinates    Patient does not know if she has a temperature    Patient is on hour and 15 minutes from the office     Patient was advised to seek evaluation at her PCP or urgent care setting per work in  [place]      Patient verbalized understanding.

## 2024-04-24 RX ORDER — LEVOTHYROXINE SODIUM 0.07 MG/1
75 TABLET ORAL
Qty: 90 TABLET | Refills: 3 | OUTPATIENT
Start: 2024-04-24

## 2024-06-28 NOTE — PROGRESS NOTES
Lia May is a 34 y.o. female presents for a new pregnancy visit.    Chief Complaint   Patient presents with    Initial Prenatal Visit         No LMP recorded. Patient is pregnant.    Last Pap: 2023 normal/HPV neg    Conceived by 1 PGT- a euploid blastocyst at [place]. ANDREI 2025      Pregnancy symptoms:    Since her LMP she has experienced urinary frequency, breast tenderness, and nausea.   She has not been vomiting over the last few weeks.  Associated signs and symptoms which she denies: dysuria, discharge, vaginal bleeding.    She states she has gained weight:  Approximately 5 pounds over the last few weeks.    Relevant past pregnancy history:   She has the following pregnancy history:     She has no history of  delivery.    Relevant past medical history:(relevant to this pregnancy): noncontributory.      Her occupation is: works from home.       Examination chaperoned by Lia Jenkins MA.

## 2024-07-01 ENCOUNTER — TELEPHONE (OUTPATIENT)
Age: 34
End: 2024-07-01

## 2024-07-01 ENCOUNTER — ROUTINE PRENATAL (OUTPATIENT)
Age: 34
End: 2024-07-01

## 2024-07-01 VITALS
DIASTOLIC BLOOD PRESSURE: 85 MMHG | WEIGHT: 176.8 LBS | BODY MASS INDEX: 27.75 KG/M2 | SYSTOLIC BLOOD PRESSURE: 122 MMHG | HEIGHT: 67 IN

## 2024-07-01 DIAGNOSIS — O09.529 SUPERVISION OF ELDERLY MULTIGRAVIDA, ANTEPARTUM: Primary | ICD-10-CM

## 2024-07-01 PROBLEM — O09.519 SUPERVISION OF PRIMIGRAVIDA OF ADVANCED MATERNAL AGE, ANTEPARTUM: Status: RESOLVED | Noted: 2023-01-30 | Resolved: 2024-07-01

## 2024-07-01 LAB
ABO, EXTERNAL RESULT: NORMAL
C. TRACHOMATIS, EXTERNAL RESULT: NEGATIVE
HEP B, EXTERNAL RESULT: NEGATIVE
HEPATITIS C ANTIBODY, EXTERNAL RESULT: NON REACTIVE
HIV, EXTERNAL RESULT: NON REACTIVE
N. GONORRHOEAE, EXTERNAL RESULT: NEGATIVE
RH FACTOR, EXTERNAL RESULT: POSITIVE
RUBELLA TITER, EXTERNAL RESULT: NORMAL
T. PALLIDUM (SYPHILIS) ANTIBODY, EXTERNAL RESULT: NON REACTIVE

## 2024-07-01 PROCEDURE — 0501F PRENATAL FLOW SHEET: CPT | Performed by: OBSTETRICS & GYNECOLOGY

## 2024-07-01 RX ORDER — LEVOTHYROXINE SODIUM 0.1 MG/1
100 TABLET ORAL DAILY
COMMUNITY
Start: 2024-05-06

## 2024-07-01 RX ORDER — PNV NO.95/FERROUS FUM/FOLIC AC 28MG-0.8MG
1 TABLET ORAL DAILY
Qty: 90 TABLET | Refills: 5 | Status: SHIPPED | OUTPATIENT
Start: 2024-07-01

## 2024-07-01 SDOH — ECONOMIC STABILITY: INCOME INSECURITY: HOW HARD IS IT FOR YOU TO PAY FOR THE VERY BASICS LIKE FOOD, HOUSING, MEDICAL CARE, AND HEATING?: PATIENT DECLINED

## 2024-07-01 SDOH — ECONOMIC STABILITY: FOOD INSECURITY: WITHIN THE PAST 12 MONTHS, YOU WORRIED THAT YOUR FOOD WOULD RUN OUT BEFORE YOU GOT MONEY TO BUY MORE.: PATIENT DECLINED

## 2024-07-01 SDOH — ECONOMIC STABILITY: HOUSING INSECURITY
IN THE LAST 12 MONTHS, WAS THERE A TIME WHEN YOU DID NOT HAVE A STEADY PLACE TO SLEEP OR SLEPT IN A SHELTER (INCLUDING NOW)?: PATIENT DECLINED

## 2024-07-01 SDOH — ECONOMIC STABILITY: FOOD INSECURITY: WITHIN THE PAST 12 MONTHS, THE FOOD YOU BOUGHT JUST DIDN'T LAST AND YOU DIDN'T HAVE MONEY TO GET MORE.: PATIENT DECLINED

## 2024-07-01 ASSESSMENT — PATIENT HEALTH QUESTIONNAIRE - PHQ9
SUM OF ALL RESPONSES TO PHQ QUESTIONS 1-9: 0
1. LITTLE INTEREST OR PLEASURE IN DOING THINGS: NOT AT ALL
SUM OF ALL RESPONSES TO PHQ9 QUESTIONS 1 & 2: 0
SUM OF ALL RESPONSES TO PHQ QUESTIONS 1-9: 0
SUM OF ALL RESPONSES TO PHQ QUESTIONS 1-9: 0
2. FEELING DOWN, DEPRESSED OR HOPELESS: NOT AT ALL
SUM OF ALL RESPONSES TO PHQ QUESTIONS 1-9: 0

## 2024-07-01 NOTE — TELEPHONE ENCOUNTER
Received an referral to have pt seen with MFM. Scheduled pt for 09/3 at 1 for her ultrasound and a VV at 2 for GC. Called pt twice to confirm appt, no answer, left vm, sent mychart message to have pt give the office a call.

## 2024-07-01 NOTE — PROGRESS NOTES
Current pregnancy history:    Lia May is a ,  34 y.o. female White (non-) No LMP recorded. Patient is pregnant..  She presents for the evaluation of amenorrhea and a positive pregnancy test.    Per nursing Note:  Conceived by 1 PGT- a euploid blastocyst at [place]. ANDREI 2025        Pregnancy symptoms:     Since her LMP she has experienced urinary frequency, breast tenderness, and nausea.   She has not been vomiting over the last few weeks.  Associated signs and symptoms which she denies: dysuria, discharge, vaginal bleeding.     She states she has gained weight:  Approximately 5 pounds over the last few weeks.     Relevant past pregnancy history:              She has the following pregnancy history:                She has no history of  delivery.     Relevant past medical history:(relevant to this pregnancy): noncontributory.       Her occupation is: works from home.     Substance history: negative for alcohol, tobacco and street drugs.           Positive for nothing.  Exposure history: There is/are indoor cat/s in the home.  The patient was instructed to not change the cat litter.   She admits close contact with children on a regular basis.   She has had chicken pox or the vaccine in the past.   Patient denies issues with domestic violence.     Genetic Screening/Teratology Counseling: (Includes patient, baby's father, or anyone in either family with:)  1.  Patient's age >/= 35 at EDC?-- 35 at delivery   2.  Thalassemia (Wallisian, Greek, Mediterranean, or  background): MCV<80?--no.     3.  Neural tube defect (meningomyelocele, spina bifida, anencephaly)?--no.   4.  Congenital heart defect?--no.  5.  Down syndrome?--no.   6.  Robert-Sachs (Pentecostal, Mosotho Anita)?--no.   7.  Canavan's Disease?--no.   8.  Familial Dysautonomia?--no.   9.  Sickle cell disease or trait ()?--no   The patient has not been tested for sickle trait  10.  Hemophilia or other blood

## 2024-07-02 ENCOUNTER — TELEPHONE (OUTPATIENT)
Age: 34
End: 2024-07-02

## 2024-07-02 LAB
ABO GROUP BLD: NORMAL
ALBUMIN SERPL-MCNC: 4.2 G/DL (ref 3.9–4.9)
ALP SERPL-CCNC: 54 IU/L (ref 44–121)
ALT SERPL-CCNC: 16 IU/L (ref 0–32)
AST SERPL-CCNC: 15 IU/L (ref 0–40)
BASOPHILS # BLD AUTO: 0 X10E3/UL (ref 0–0.2)
BASOPHILS NFR BLD AUTO: 0 %
BILIRUB SERPL-MCNC: 0.2 MG/DL (ref 0–1.2)
BLD GP AB SCN SERPL QL: NEGATIVE
BUN SERPL-MCNC: 9 MG/DL (ref 6–20)
BUN/CREAT SERPL: 17 (ref 9–23)
CALCIUM SERPL-MCNC: 9.3 MG/DL (ref 8.7–10.2)
CHLORIDE SERPL-SCNC: 102 MMOL/L (ref 96–106)
CO2 SERPL-SCNC: 22 MMOL/L (ref 20–29)
CREAT SERPL-MCNC: 0.53 MG/DL (ref 0.57–1)
EGFRCR SERPLBLD CKD-EPI 2021: 123 ML/MIN/1.73
EOSINOPHIL # BLD AUTO: 0.1 X10E3/UL (ref 0–0.4)
EOSINOPHIL NFR BLD AUTO: 1 %
ERYTHROCYTE [DISTWIDTH] IN BLOOD BY AUTOMATED COUNT: 12.1 % (ref 11.7–15.4)
GLOBULIN SER CALC-MCNC: 2.3 G/DL (ref 1.5–4.5)
GLUCOSE SERPL-MCNC: 77 MG/DL (ref 70–99)
HBV SURFACE AG SERPL QL IA: NEGATIVE
HCT VFR BLD AUTO: 38 % (ref 34–46.6)
HCV IGG SERPL QL IA: NON REACTIVE
HGB BLD-MCNC: 12.7 G/DL (ref 11.1–15.9)
HIV 1+2 AB+HIV1 P24 AG SERPL QL IA: NON REACTIVE
IMM GRANULOCYTES # BLD AUTO: 0 X10E3/UL (ref 0–0.1)
IMM GRANULOCYTES NFR BLD AUTO: 0 %
LYMPHOCYTES # BLD AUTO: 2.4 X10E3/UL (ref 0.7–3.1)
LYMPHOCYTES NFR BLD AUTO: 30 %
MCH RBC QN AUTO: 31.2 PG (ref 26.6–33)
MCHC RBC AUTO-ENTMCNC: 33.4 G/DL (ref 31.5–35.7)
MCV RBC AUTO: 93 FL (ref 79–97)
MONOCYTES # BLD AUTO: 0.6 X10E3/UL (ref 0.1–0.9)
MONOCYTES NFR BLD AUTO: 8 %
NEUTROPHILS # BLD AUTO: 4.8 X10E3/UL (ref 1.4–7)
NEUTROPHILS NFR BLD AUTO: 61 %
PLATELET # BLD AUTO: 365 X10E3/UL (ref 150–450)
POTASSIUM SERPL-SCNC: 4.1 MMOL/L (ref 3.5–5.2)
PROT SERPL-MCNC: 6.5 G/DL (ref 6–8.5)
RBC # BLD AUTO: 4.07 X10E6/UL (ref 3.77–5.28)
RH BLD: POSITIVE
RUBV IGG SERPL IA-ACNC: 2.69 INDEX
SODIUM SERPL-SCNC: 137 MMOL/L (ref 134–144)
WBC # BLD AUTO: 7.9 X10E3/UL (ref 3.4–10.8)

## 2024-07-02 NOTE — TELEPHONE ENCOUNTER
PT name and  verified    35 yo last ov 24, next ov 24  , 11w2d    PT calling, was seen in the office yesterday , had relayed to MD then she had some brownish spotting when wiping yesterday, but today she states it is more pinkish and she is concerned.  PT denies any abdominal cramping and only when she wipes. PT has not had recent intercourse in past week. RN asked how hydration was and PT states she could be dehydrated, encouraged PT to drink water 8 glasses a day, at least.  PT is also wondering if stress could be causing this, as she has been really stressed with her job, especially the past 2 days.    Please advise  Thank you

## 2024-07-02 NOTE — TELEPHONE ENCOUNTER
Called pt to confirm appt. She stated that she was aware that Dr Holm was going to see us an referral but she is wanting to hold off scheduling the appointment until she is further along. Cancelled both appts

## 2024-07-02 NOTE — TELEPHONE ENCOUNTER
PT call returned, left generic vm, call office back at earliest convenience and to check MC for message.  PT sent MD's response to MC:  Roselia Holm MD         7/2/24 12:50 PM  Note     Very likely from exam yesterday. If enough  to soak pads let us know

## 2024-07-03 LAB
BACTERIA UR CULT: NORMAL
C TRACH RRNA SPEC QL NAA+PROBE: NEGATIVE
N GONORRHOEA RRNA SPEC QL NAA+PROBE: NEGATIVE
SPECIMEN STATUS REPORT: NORMAL
T VAGINALIS RRNA SPEC QL NAA+PROBE: NEGATIVE
TREPONEMA PALLIDUM IGG+IGM AB [PRESENCE] IN SERUM OR PLASMA BY IMMUNOASSAY: NON REACTIVE

## 2024-07-11 LAB
Lab: NORMAL
NTRA 1P36 DELETION SYNDROME POPULATION-BASED RISK TEXT: NORMAL
NTRA 1P36 DELETION SYNDROME RESULT TEXT: NORMAL
NTRA 1P36 DELETION SYNDROME RISK SCORE TEXT: NORMAL
NTRA 22Q11.2 DELETION SYNDROME POPULATION-BASED RISK TEXT: NORMAL
NTRA 22Q11.2 DELETION SYNDROME RESULT TEXT: NORMAL
NTRA 22Q11.2 DELETION SYNDROME RISK SCORE TEXT: NORMAL
NTRA ANGELMAN SYNDROME POPULATION-BASED RISK TEXT: NORMAL
NTRA ANGELMAN SYNDROME RESULT TEXT: NORMAL
NTRA ANGELMAN SYNDROME RISK SCORE TEXT: NORMAL
NTRA CRI-DU-CHAT SYNDROME POPULATION-BASED RISK TEXT: NORMAL
NTRA CRI-DU-CHAT SYNDROME RESULT TEXT: NORMAL
NTRA CRI-DU-CHAT SYNDROME RISK SCORE TEXT: NORMAL
NTRA FETAL FRACTION: NORMAL
NTRA FETAL RHD SUMMARY: NORMAL
NTRA GENDER OF FETUS: NORMAL
NTRA MONOSOMY X AGE-BASED RISK TEXT: NORMAL
NTRA MONOSOMY X RESULT TEXT: NORMAL
NTRA MONOSOMY X RISK SCORE TEXT: NORMAL
NTRA PRADER-WILLI SYNDROME POPULATION-BASED RISK TEXT: NORMAL
NTRA PRADER-WILLI SYNDROME RESULT TEXT: NORMAL
NTRA PRADER-WILLI SYNDROME RISK SCORE TEXT: NORMAL
NTRA TRIPLOIDY RESULT TEXT: NORMAL
NTRA TRISOMY 13 AGE-BASED RISK TEXT: NORMAL
NTRA TRISOMY 13 RESULT TEXT: NORMAL
NTRA TRISOMY 13 RISK SCORE TEXT: NORMAL
NTRA TRISOMY 18 AGE-BASED RISK TEXT: NORMAL
NTRA TRISOMY 18 RESULT TEXT: NORMAL
NTRA TRISOMY 18 RISK SCORE TEXT: NORMAL
NTRA TRISOMY 21 AGE-BASED RISK TEXT: NORMAL
NTRA TRISOMY 21 RESULT TEXT: NORMAL
NTRA TRISOMY 21 RISK SCORE TEXT: NORMAL

## 2024-08-09 ENCOUNTER — ROUTINE PRENATAL (OUTPATIENT)
Age: 34
End: 2024-08-09

## 2024-08-09 VITALS
BODY MASS INDEX: 27.88 KG/M2 | HEART RATE: 96 BPM | WEIGHT: 178 LBS | DIASTOLIC BLOOD PRESSURE: 85 MMHG | SYSTOLIC BLOOD PRESSURE: 119 MMHG

## 2024-08-09 DIAGNOSIS — Z36.3 ENCOUNTER FOR ROUTINE SCREENING FOR MALFORMATION USING ULTRASONICS: ICD-10-CM

## 2024-08-09 DIAGNOSIS — O09.529 SUPERVISION OF ELDERLY MULTIGRAVIDA, ANTEPARTUM: Primary | ICD-10-CM

## 2024-08-09 DIAGNOSIS — Z3A.16 16 WEEKS GESTATION OF PREGNANCY: ICD-10-CM

## 2024-08-09 DIAGNOSIS — E03.9 HYPOTHYROIDISM, UNSPECIFIED TYPE: ICD-10-CM

## 2024-08-09 DIAGNOSIS — Z36.9 UNSPECIFIED ANTENATAL SCREENING: ICD-10-CM

## 2024-08-09 LAB
CREAT UR-MCNC: 219 MG/DL
PROT UR-MCNC: 13 MG/DL (ref 0–11.9)
PROT/CREAT UR-RTO: 0.1
TSH SERPL DL<=0.05 MIU/L-ACNC: 1.05 UIU/ML (ref 0.36–3.74)

## 2024-08-09 NOTE — PROGRESS NOTES
Doing well  AFP today, check TSH  Needs baseline PCR      Disc needs fetal scan due to CELINA HAYES. Patient says it's $4k per scan and she declines. I explained standard of care is [facility] scan - they check for more things and see them better. She understands we may miss something on our basic anatomy scan but still declines to go to [facility] at this time.

## 2024-08-09 NOTE — PROGRESS NOTES
Intrauterine pregnancy with the following problems identified:   EDC 1/19/2025  IVF/FET   Euploid blastocyst  NIPTS- normal female  Carrier screening - FOB was negative  AMA 35 at delivery  Baby ASA  Hx of LTCS - repeat at term  CHTN  PCR  Hypothyroidism - check TSH next visit Syntroid 100mcg  Depression - declines medication at this time

## 2024-08-09 NOTE — PROGRESS NOTES
Intrauterine pregnancy with the following problems identified:   EDC 1/19/2025  IVF/FET   Euploid blastocyst  NIPTS- normal female  Carrier screening - FOB was negative  AMA 35 at delivery  Baby ASA  Hx of LTCS - repeat at term  CHTN  PCR  Hypothyroidism - check TSH next visit Syntroid 100mcg  Depression - declines medication at this time    Pt complains of high amounts of heartburn

## 2024-08-09 NOTE — PATIENT INSTRUCTIONS
Patient Education        Weeks 14 to 18 of Your Pregnancy: Care Instructions  Around this time, you may start to look pregnant. Your baby is now able to pass urine. And the first stool (meconium) is starting to collect in your baby's intestines. Hair is starting to grow on your baby's head.    You may notice some skin changes, such as itchy spots on your palms or acne on your face.   At your next doctor visit, you may have an ultrasound. So you might think about whether you want to know the sex of your baby. Also ask your doctor about flu and COVID-19 shots.      How to reduce stress   Ask for help when you need it.  Try to avoid things that cause you stress.  Seek out things that relieve stress, such as breathing exercises or yoga.     How to get exercise   If you don't usually exercise, start slowly. Short walks may be a good choice.  Try to be active 30 minutes a day, at least 5 days a week.  Avoid activities where you're more likely to fall.  Use light weights to reduce stress on your joints.     How to stay at a healthy weight for you   Talk to your doctor or midwife about how much weight you should gain.  It's generally best to gain:  About 28 to 40 pounds if you're underweight.  About 25 to 35 pounds if you're at a healthy weight.  About 15 to 25 pounds if you're overweight.  About 11 to 20 pounds if you're very overweight (obese).  Follow-up care is a key part of your treatment and safety. Be sure to make and go to all appointments, and call your doctor if you are having problems. It's also a good idea to know your test results and keep a list of the medicines you take.  Where can you learn more?  Go to [email or web address]/[email or web address] and enter I453 to learn more about \"Weeks 14 to 18 of Your Pregnancy: Care Instructions.\"  Current as of: July 10, 2023  Content Version: 14.1  © 8124-7248 Healthwise, Incorporated.   Care instructions adapted under license by [employer]. If you have questions about a

## 2024-08-11 LAB
AFP INTERP SERPL-IMP: NORMAL
AFP MOM SERPL: 1.56
AFP SERPL-MCNC: 48 NG/ML
AGE AT DELIVERY: 37 YR
AGE OF EGG DONOR: NORMAL
AGE OF EGG DONOR: NORMAL
COMMENT: NORMAL
DONOR EGG?: NO
DONOR EGG?: NORMAL
FAMILY HISTORY NTD: NORMAL
FHX NTD (Y OR N): NORMAL
GA METHOD: NORMAL
GA: 16.1 WEEKS
IDDM PATIENT QL: NO
INSULIN DEP. DIABETIC: NORMAL
Lab: 176
Lab: NORMAL
Lab: NORMAL
MAT SCN FOR FETAL ABNORMALITIES SERPL: NORMAL
MULTIPLE PREGNANCY: NO
NEURAL TUBE DEFECT RISK FETUS: 2331
NUMBER OF FETUSES: NO
OTHER INDICATIONS: NO
OTHER INDICATIONS: NORMAL
PREVIOUSLY ELEVATED AFP (Y OR N): 16
PREVIOUSLY ELEVATED AFP (Y OR N): NO
PRIOR 1ST TRIM TESTING ?: NO
PRIOR 2ND TRIM TESTING ?: NO
PRIOR DS/NTD SCREEN CURRENT PREGNANCY?: NO
PRIOR DS/NTD SCREEN CURRENT PREGNANCY?: NORMAL
PRIOR FIRST TRIMESTER TESTING (Y OR N ): NORMAL
PRIOR PREGNANCY WITH DOWN SYNDROME (Y OR N): 1
PRIOR PREGNANCY WITH DOWN SYNDROME (Y OR N): NO
TYPE OF EGG DONOR: NORMAL
TYPE OF EGG DONOR: NORMAL

## 2024-08-19 ENCOUNTER — TELEPHONE (OUTPATIENT)
Age: 34
End: 2024-08-19

## 2024-08-19 NOTE — TELEPHONE ENCOUNTER
PT name and  verified      33 yo last ov 24, next ov 24  , 18w1d          PT calling stating her son started on  with \"slapped\" cheek look and fever, which broke this am, thinking he has the Parvovirus.  Her son has not been tested yet, PT states not until tomorrow, and she is wanting to know if she can get tested, as she is worried about the pregnancy and if she has been exposed to it.  RN informed PT that she may be directed to her PCP ir [facility], and she should have a definite answer after her son's been tested to be tested.  PT wants to ask MD advice or if she can be tested, states she lives [place] a [place] from an .  PT denies any sx.    Please advise  Thank you

## 2024-08-20 ENCOUNTER — LAB (OUTPATIENT)
Age: 34
End: 2024-08-20

## 2024-08-20 DIAGNOSIS — Z20.828 PARVOVIRUS EXPOSURE: Primary | ICD-10-CM

## 2024-08-20 NOTE — TELEPHONE ENCOUNTER
MC messages were sent to PT and MD recommended coming in for labs and PT is scheduled for lab appt 8/20/24 at 0910 am.

## 2024-08-22 LAB
B19V IGG SER IA-ACNC: 6.7 INDEX (ref 0–0.8)
B19V IGM SER IA-ACNC: 0.2 INDEX (ref 0–0.8)

## 2024-08-26 NOTE — PROGRESS NOTES
Intrauterine pregnancy with the following problems identified:   EDC 1/19/2025  IVF/FET   Euploid blastocyst  NIPTS- normal female  Carrier screening - FOB was negative  AMA 35 at delivery  Baby ASA  Hx of LTCS - repeat at term  CHTN  Baseline PCR 0.2   Hypothyroidism   Syntroid 100mcg  Depression - declines medication at this time  AFP Negative    ULTRASOUND:  STANDARD FETAL SURVEY A SINGLE VIABLE IUP AT 20W3D GA BY LMP IS SEEN. FETAL CARDIAC MOTION OBSERVED. FETAL ANATOMY VISUALIZED. FETAL ANATOMY NOT SEEN: PROFILE, LVOT AND RVOT. FOLLOW UP NEEDED TO COMPLETE FETAL SURVEY. APPROPRIATE FETAL GROWTH IS SEEN. SIZE=DATES. OC, CERVIX AND PLACENTA APPEAR WITHIN NORMAL LIMITS. GENDER: FEMALE

## 2024-09-03 NOTE — PATIENT INSTRUCTIONS
Patient Education        Weeks 18 to 22 of Your Pregnancy: Care Instructions  At this stage you may find that your nausea and fatigue are gone. You may feel better overall and have more energy. But you might now also have some new discomforts, like sleep problems or leg cramps.    You may start to feel your baby move. These movements can feel like butterflies or bubbles.   Babies at this stage can now suck their thumbs.     Get some exercise every day.  And avoid caffeine late in the day.     Take a warm shower or bath before bed.  Try relaxation exercises to calm your mind and body.     Use extra pillows.  They can help you get comfortable.     Don't use sleeping pills or alcohol.  They could harm your baby.     For leg cramps, stretch and apply heat.  A warm bath, leg warmers, a heating pad, or a hot water bottle can help with muscle aches.   Stretches for leg cramps    Straighten your leg and bend your foot (flex your ankle) slowly upward, toward your knee. Bend your toes up and down.   Stand on a flat surface. Stretch your toes upward. For balance, hold on to the wall or something stable. If it feels okay, take small steps walking on your heels.   Follow-up care is a key part of your treatment and safety. Be sure to make and go to all appointments, and call your doctor if you are having problems. It's also a good idea to know your test results and keep a list of the medicines you take.  Where can you learn more?  Go to https://[email or web address]/patientEd and enter W603 to learn more about \"Weeks 18 to 22 of Your Pregnancy: Care Instructions.\"  Current as of: July 10, 2023  Content Version: 14.1  © 8062-1670 [employer].   Care instructions adapted under license by [employer]. If you have questions about a medical condition or this instruction, always ask your healthcare professional. [employer] disclaims any warranty or liability for your use of this information.

## 2024-09-04 ENCOUNTER — ROUTINE PRENATAL (OUTPATIENT)
Age: 34
End: 2024-09-04

## 2024-09-04 VITALS
WEIGHT: 183.6 LBS | SYSTOLIC BLOOD PRESSURE: 101 MMHG | DIASTOLIC BLOOD PRESSURE: 64 MMHG | BODY MASS INDEX: 28.76 KG/M2 | HEART RATE: 88 BPM

## 2024-09-04 DIAGNOSIS — Z3A.20 20 WEEKS GESTATION OF PREGNANCY: ICD-10-CM

## 2024-09-04 DIAGNOSIS — O09.529 SUPERVISION OF ELDERLY MULTIGRAVIDA, ANTEPARTUM: Primary | ICD-10-CM

## 2024-09-04 DIAGNOSIS — Z36.2 ENCOUNTER FOR FOLLOW-UP ULTRASOUND OF FETAL ANATOMY: Primary | ICD-10-CM

## 2024-09-04 PROCEDURE — 0502F SUBSEQUENT PRENATAL CARE: CPT | Performed by: OBSTETRICS & GYNECOLOGY

## 2024-09-04 NOTE — PROGRESS NOTES
Baby moving  Doing well    US images reviewed. Disc again with patient that scan in our office (pt declined MFM) is not the same level scan as MFM. Needs additional views for outflow tracts.     STANDARD FETAL SURVEY A SINGLE VIABLE IUP AT 20W3D GA BY LMP IS SEEN. FETAL CARDIAC MOTION OBSERVED. FETAL ANATOMY VISUALIZED. FETAL ANATOMY NOT SEEN: PROFILE, LVOT AND RVOT. FOLLOW UP NEEDED TO COMPLETE FETAL SURVEY. APPROPRIATE FETAL GROWTH IS SEEN. SIZE=DATES. OC, CERVIX AND PLACENTA APPEAR WITHIN NORMAL LIMITS. GENDER: FEMALE

## 2024-09-10 NOTE — PROGRESS NOTES
Intrauterine pregnancy with the following problems identified:   EDC 1/19/2025  IVF/FET   Euploid blastocyst  NIPTS- normal female  Carrier screening - FOB was negative  AMA 35 at delivery  Baby ASA  Hx of LTCS - repeat at term  CHTN  Baseline PCR 0.2   Hypothyroidism - check TSH next visit Syntroid 100mcg  Depression - declines medication at this time   US:  STANDARD FETAL SURVEY FOLLOW UP A SINGLE VIABLE IUP AT 24W3D GA BY LMP IS SEEN. FETAL CARDIAC MOTION OBSERVED. FETAL ANATOMY WELL VISUALIZED AND APPEARS WITHIN NORMAL LIMITS. NO ABNORMALITIES ARE SEEN ON TODAY'S EXAM. FETAL SURVEY IS COMPLETE. APPROPRIATE FETAL GROWTH IS SEEN. SIZE=DATES. OC, CERVIX AND PLACENTA APPEAR WITHIN NORMAL LIMITS.

## 2024-10-02 ENCOUNTER — ROUTINE PRENATAL (OUTPATIENT)
Age: 34
End: 2024-10-02

## 2024-10-02 VITALS
DIASTOLIC BLOOD PRESSURE: 76 MMHG | BODY MASS INDEX: 29.07 KG/M2 | SYSTOLIC BLOOD PRESSURE: 107 MMHG | HEART RATE: 80 BPM | WEIGHT: 185.6 LBS

## 2024-10-02 DIAGNOSIS — Z3A.24 24 WEEKS GESTATION OF PREGNANCY: ICD-10-CM

## 2024-10-02 DIAGNOSIS — O09.529 SUPERVISION OF ELDERLY MULTIGRAVIDA, ANTEPARTUM: Primary | ICD-10-CM

## 2024-10-02 PROCEDURE — 0502F SUBSEQUENT PRENATAL CARE: CPT | Performed by: OBSTETRICS & GYNECOLOGY

## 2024-10-02 NOTE — PROGRESS NOTES
Baby moving  Wants to TOLAC this pregnancy - has one more embryo to use  Disc <1% risk of rupture, could be catastrophic. Had elective primary CS last time because fetal head so large  Declines flu vaccine    US reviewed  Fetus in transverse position.  784 g at 75th percentile.  Measurements consistent with dates 25 weeks 0 days.  OC 15 cm.  Limited anatomy is normal including views of the heart which could not be done at last visit.  Follow-up views of anatomy are normal.    Patient aware that a maternal-fetal medicine scan is indicated and she has declined.  She understands that our scan is not equivalent and that not all anomalies may be seen.

## 2024-10-02 NOTE — PATIENT INSTRUCTIONS
provider.  Adverse reactions should be reported to the Vaccine Adverse Event Reporting System (VAERS). Your health care provider will usually file this report, or you can do it yourself. Visit the VAERS website at www.vaers.[facility].gov or call 1-207.286.8092. VAERS is only for reporting reactions, and VAERS staff members do not give medical advice.  The National Vaccine Injury Compensation Program  The National Vaccine Injury Compensation Program (VICP) is a federal program that was created to compensate people who may have been injured by certain vaccines. Claims regarding alleged injury or death due to vaccination have a time limit for filing, which may be as short as two years. Visit the VICP website at www.[facility].gov/vaccinecompensation or call 1-653.239.9757 to learn about the program and about filing a claim.  How can I learn more?  Ask your health care provider.  Call your local or state health department.  Visit the website of the Food and Drug Administration (FDA) for vaccine package inserts and additional information at www.fda.gov/vaccines-blood-biologics/vaccines.  Contact the Centers for Disease Control and Prevention (CDC):  Call 1-137.761.5715 (7-318-YHZ-INFO) or  Visit CDC's website at www.cdc.gov/flu.  Vaccine Information Statement  Inactivated Influenza Vaccine  8/6/2021  42 U.S.C. § 300aa-26  Department of Health and Human Services  Centers for Disease Control and Prevention  Many vaccine information statements are available in Urdu and other languages. See www.immunize.org/vis.  Hojas de información sobre vacunas están disponibles en español y en muchos otros idiomas. Visite www.immunize.org/vis.  Care instructions adapted under license by [employer]. If you have questions about a medical condition or this instruction, always ask your healthcare professional. Healthwise, Incorporated disclaims any warranty or liability for your use of this information.

## 2024-10-08 NOTE — PROGRESS NOTES
Intrauterine pregnancy with the following problems identified:   EDC 1/19/2025  IVF/FET   Euploid blastocyst  NIPTS- normal female  Carrier screening - FOB was negative  AMA 37 at delivery  Baby ASA  Hx of LTCS - repeat at term  CHTN  Baseline PCR 0.2   Hypothyroidism - check TSH next visit Syntroid 100mcg  Depression - declines medication at this time

## 2024-10-10 RX ORDER — LEVOTHYROXINE SODIUM 100 UG/1
100 TABLET ORAL DAILY
Qty: 90 TABLET | Refills: 2 | Status: SHIPPED | OUTPATIENT
Start: 2024-10-10

## 2024-11-04 ENCOUNTER — ROUTINE PRENATAL (OUTPATIENT)
Age: 34
End: 2024-11-04

## 2024-11-04 VITALS
BODY MASS INDEX: 30.38 KG/M2 | DIASTOLIC BLOOD PRESSURE: 77 MMHG | SYSTOLIC BLOOD PRESSURE: 118 MMHG | WEIGHT: 194 LBS | HEART RATE: 100 BPM

## 2024-11-04 DIAGNOSIS — Z3A.29 29 WEEKS GESTATION OF PREGNANCY: ICD-10-CM

## 2024-11-04 DIAGNOSIS — O09.529 SUPERVISION OF ELDERLY MULTIGRAVIDA, ANTEPARTUM: Primary | ICD-10-CM

## 2024-11-04 DIAGNOSIS — Z23 NEED FOR VACCINATION: ICD-10-CM

## 2024-11-04 PROCEDURE — 0502F SUBSEQUENT PRENATAL CARE: CPT | Performed by: OBSTETRICS & GYNECOLOGY

## 2024-11-04 NOTE — PROGRESS NOTES
Baby moving  Glucola and tdap today - declines tdap  Wants to TOLAC - disc risk <1% risk of rupture -disc consent

## 2024-11-05 LAB
BASOPHILS # BLD: 0 K/UL (ref 0–0.1)
BASOPHILS NFR BLD: 0 % (ref 0–1)
DIFFERENTIAL METHOD BLD: ABNORMAL
EOSINOPHIL # BLD: 0 K/UL (ref 0–0.4)
EOSINOPHIL NFR BLD: 0 % (ref 0–7)
ERYTHROCYTE [DISTWIDTH] IN BLOOD BY AUTOMATED COUNT: 13.5 % (ref 11.5–14.5)
GLUCOSE 1H P 50 G GLC PO SERPL-MCNC: 111 MG/DL (ref 70–139)
HCT VFR BLD AUTO: 32.2 % (ref 35–47)
HGB BLD-MCNC: 10.9 G/DL (ref 11.5–16)
IMM GRANULOCYTES # BLD AUTO: 0 K/UL
IMM GRANULOCYTES NFR BLD AUTO: 0 %
LYMPHOCYTES # BLD: 1.6 K/UL (ref 0.8–3.5)
LYMPHOCYTES NFR BLD: 27 % (ref 12–49)
MCH RBC QN AUTO: 32.2 PG (ref 26–34)
MCHC RBC AUTO-ENTMCNC: 33.9 G/DL (ref 30–36.5)
MCV RBC AUTO: 95.3 FL (ref 80–99)
MONOCYTES # BLD: 0.5 K/UL (ref 0–1)
MONOCYTES NFR BLD: 9 % (ref 5–13)
NEUTS BAND NFR BLD MANUAL: 4 % (ref 0–6)
NEUTS SEG # BLD: 3.9 K/UL (ref 1.8–8)
NEUTS SEG NFR BLD: 60 % (ref 32–75)
NRBC # BLD: 0 K/UL (ref 0–0.01)
NRBC BLD-RTO: 0 PER 100 WBC
PLATELET # BLD AUTO: 215 K/UL (ref 150–400)
PMV BLD AUTO: 9.8 FL (ref 8.9–12.9)
RBC # BLD AUTO: 3.38 M/UL (ref 3.8–5.2)
RBC MORPH BLD: ABNORMAL
WBC # BLD AUTO: 6 K/UL (ref 3.6–11)
WBC MORPH BLD: ABNORMAL

## 2024-11-05 NOTE — PROGRESS NOTES
Intrauterine pregnancy with the following problems identified:   EDC 1/19/2025  IVF/FET   Euploid blastocyst  NIPTS- normal female  Carrier screening - FOB was negative  AMA 37 at delivery  Baby ASA  Hx of LTCS - repeat at term  CHTN  Baseline PCR 0.2   Hypothyroidism - check TSH next visit Syntroid 100mcg  Depression - declines medication at this time  Declines tdap

## 2024-11-18 ENCOUNTER — ROUTINE PRENATAL (OUTPATIENT)
Age: 34
End: 2024-11-18

## 2024-11-18 VITALS
SYSTOLIC BLOOD PRESSURE: 120 MMHG | WEIGHT: 196 LBS | BODY MASS INDEX: 30.7 KG/M2 | DIASTOLIC BLOOD PRESSURE: 68 MMHG | HEART RATE: 98 BPM

## 2024-11-18 DIAGNOSIS — O16.3 HYPERTENSION AFFECTING PREGNANCY IN THIRD TRIMESTER: ICD-10-CM

## 2024-11-18 DIAGNOSIS — Z3A.31 31 WEEKS GESTATION OF PREGNANCY: ICD-10-CM

## 2024-11-18 DIAGNOSIS — O09.529 SUPERVISION OF ELDERLY MULTIGRAVIDA, ANTEPARTUM: Primary | ICD-10-CM

## 2024-11-18 PROCEDURE — 0502F SUBSEQUENT PRENATAL CARE: CPT | Performed by: OBSTETRICS & GYNECOLOGY

## 2024-11-18 NOTE — PROGRESS NOTES
Baby moving  Doing well  ACOG recommends delivery 38-39 6/7 for CHTN no meds - post 1/12-1/13  Disc cook/pitocin. Patient understands risks of TOLAC  US for growth next visit

## 2024-12-03 NOTE — PATIENT INSTRUCTIONS
Patient Education        Weeks 32 to 34 of Your Pregnancy: Care Instructions    Decide whether you want to bank or donate your baby's umbilical cord blood. If you want to save this blood, you have to arrange for it ahead of time.   Decide about circumcision. Personal, Worship, or cultural beliefs may play a role in your decision. You get to decide what you want for your baby.         Learn how to ease hemorrhoids.   Get more liquids, fruits, vegetables, and fiber in your diet.  Avoid sitting for too long.  Clean yourself with moist toilet paper. Or try witch hazel pads.  Try ice packs or warm sitz baths for discomfort.  Use hydrocortisone cream for pain or itching.  Ask your doctor about stool softeners.        Consider the benefits of breastfeeding.   It reduces your baby's risk of sudden infant death syndrome (SIDS).   babies are less likely to get certain infections. And they're less likely to be obese or get diabetes later in life.  It can lower your risk of breast and ovarian cancers and osteoporosis.  It saves you money.  Follow-up care is a key part of your treatment and safety. Be sure to make and go to all appointments, and call your doctor if you are having problems. It's also a good idea to know your test results and keep a list of the medicines you take.  Where can you learn more?  Go to https://www.[email or web address].net/patientEd and enter X711 to learn more about \"Weeks 32 to 34 of Your Pregnancy: Care Instructions.\"  Current as of: July 10, 2023  Content Version: 14.2  © 2024 [employer].   Care instructions adapted under license by [employer]. If you have questions about a medical condition or this instruction, always ask your healthcare professional. [employer] disclaims any warranty or liability for your use of this information.

## 2024-12-04 ENCOUNTER — ROUTINE PRENATAL (OUTPATIENT)
Age: 34
End: 2024-12-04

## 2024-12-04 VITALS
HEART RATE: 87 BPM | SYSTOLIC BLOOD PRESSURE: 111 MMHG | WEIGHT: 192 LBS | DIASTOLIC BLOOD PRESSURE: 72 MMHG | BODY MASS INDEX: 30.07 KG/M2

## 2024-12-04 DIAGNOSIS — Z36.4 ENCOUNTER FOR ANTENATAL SCREENING FOR FETAL GROWTH RETARDATION: ICD-10-CM

## 2024-12-04 DIAGNOSIS — O99.019 ANEMIA AFFECTING SECOND PREGNANCY: ICD-10-CM

## 2024-12-04 DIAGNOSIS — Z3A.33 33 WEEKS GESTATION OF PREGNANCY: ICD-10-CM

## 2024-12-04 DIAGNOSIS — O09.529 SUPERVISION OF ELDERLY MULTIGRAVIDA, ANTEPARTUM: Primary | ICD-10-CM

## 2024-12-04 PROCEDURE — 0502F SUBSEQUENT PRENATAL CARE: CPT | Performed by: OBSTETRICS & GYNECOLOGY

## 2024-12-04 NOTE — PROGRESS NOTES
Baby moving  EFW 90%tile - last baby elective CS 8-6oz. Would like to have TOLAC  Disc risk of elective IOL - would recommend waiting until spontaneous labor at least until EDC  Check CBC  Repeat US in 4 weeks    Ultrasound Result (most recent):  US OB FOLLOW UP TRANSABDOMINAL APPROACH 12/04/2024    Narrative  AC/BPD/FL/HC 2636g (2tv71sd) ± 385g 89.9%    BPD ([facility]) 8.75 cm 8.75 avg. 91.0% 35w2d  OFD (HC) 11.60 cm 11.60 avg.  HC ([facility]) 32.64 cm 32.64 avg. 93.2% 37w0d  AC ([facility]) 31.51 cm 31.70 31.31 avg. 94.5% 35w3d  FL ([facility]) 6.65 cm 6.65 avg. 61.2% 34w2d    OC  Q1 5.39 cm 5.39 avg.  Q2 3.22 cm 3.22 avg.  Q3 2.53 cm 2.53 avg.  Q4 1.88 cm 1.88 avg.  OC 13.02 cm 13.02    Fetal Heart Rate  Ventricular  bpm 121 avg.  Fetal Anatomy  Thorax  Diaphragm normal  Heart  4-chamber view normal  Cardiac rhythm regular (normal)  Abdomen  Stomach normal  Kidneys normal  Bladder normal  Placenta  Placenta Location posterior  General  Fetal Position cephalic  Amniotic Fluid normal amount    Limited OB scan was performed today.  A single vertex intrauterine pregnancy at 33 weeks 3 days is seen.  Cardiac motion is observed and normal.  Limited anatomy was visualized and appears normal appropriate growth is seen in size equals dates BPD HC and AC are all greater than the 90th percentile.  Overall growth 90 percentile 5 pounds 13 ounces

## 2024-12-05 LAB
BASOPHILS # BLD: 0 K/UL (ref 0–0.1)
BASOPHILS NFR BLD: 0 % (ref 0–1)
DIFFERENTIAL METHOD BLD: ABNORMAL
EOSINOPHIL # BLD: 0 K/UL (ref 0–0.4)
EOSINOPHIL NFR BLD: 0 % (ref 0–7)
ERYTHROCYTE [DISTWIDTH] IN BLOOD BY AUTOMATED COUNT: 14.2 % (ref 11.5–14.5)
HCT VFR BLD AUTO: 34.3 % (ref 35–47)
HGB BLD-MCNC: 11.2 G/DL (ref 11.5–16)
IMM GRANULOCYTES # BLD AUTO: 0 K/UL (ref 0–0.04)
IMM GRANULOCYTES NFR BLD AUTO: 0 % (ref 0–0.5)
LYMPHOCYTES # BLD: 3.1 K/UL (ref 0.8–3.5)
LYMPHOCYTES NFR BLD: 37 % (ref 12–49)
MCH RBC QN AUTO: 32.1 PG (ref 26–34)
MCHC RBC AUTO-ENTMCNC: 32.7 G/DL (ref 30–36.5)
MCV RBC AUTO: 98.3 FL (ref 80–99)
MONOCYTES # BLD: 0.6 K/UL (ref 0–1)
MONOCYTES NFR BLD: 7 % (ref 5–13)
NEUTS SEG # BLD: 4.6 K/UL (ref 1.8–8)
NEUTS SEG NFR BLD: 56 % (ref 32–75)
NRBC # BLD: 0 K/UL (ref 0–0.01)
NRBC BLD-RTO: 0 PER 100 WBC
PLATELET # BLD AUTO: 193 K/UL (ref 150–400)
PMV BLD AUTO: 10.5 FL (ref 8.9–12.9)
RBC # BLD AUTO: 3.49 M/UL (ref 3.8–5.2)
WBC # BLD AUTO: 8.4 K/UL (ref 3.6–11)

## 2024-12-19 ENCOUNTER — OFFICE VISIT (OUTPATIENT)
Age: 34
End: 2024-12-19

## 2024-12-19 VITALS
BODY MASS INDEX: 30.23 KG/M2 | WEIGHT: 193 LBS | DIASTOLIC BLOOD PRESSURE: 77 MMHG | SYSTOLIC BLOOD PRESSURE: 114 MMHG | HEART RATE: 87 BPM

## 2024-12-19 DIAGNOSIS — O09.529 SUPERVISION OF ELDERLY MULTIGRAVIDA, ANTEPARTUM: Primary | ICD-10-CM

## 2024-12-19 DIAGNOSIS — Z3A.35 35 WEEKS GESTATION OF PREGNANCY: ICD-10-CM

## 2024-12-19 PROCEDURE — 0502F SUBSEQUENT PRENATAL CARE: CPT | Performed by: OBSTETRICS & GYNECOLOGY

## 2024-12-19 NOTE — PROGRESS NOTES
Doing well.  Baby moving.  Patient is now considering having a repeat  and would like me to go ahead and post this.  She was supposed to have a repeat ultrasound in 4 weeks but somehow had this done today.  When I compare the measurements there is not significant growth seen.  I explained to the patient and her  that we usually do not do ultrasounds as soon as 2 weeks because is difficult to ascertain significant changes.  In addition at least the AC does not look like a good picture.  I reviewed these pictures with the ultrasound tech.  Patient does not have risk factors for fetal growth restriction.  The fetus is normally grown comparing to the ultrasound 2 weeks ago but there does not appear to be any significant interval growth.  After significant discussion with the patient and her  we decided to repeat an ultrasound in 2 weeks and compare with the original ultrasound that was done 2 weeks ago.  We will go from there.  If there is no interval growth at that point then we will send to [facility].  GBS in 2 weeks.  Will go ahead and post

## 2025-01-03 ENCOUNTER — ROUTINE PRENATAL (OUTPATIENT)
Age: 35
End: 2025-01-03

## 2025-01-03 VITALS
HEART RATE: 91 BPM | DIASTOLIC BLOOD PRESSURE: 73 MMHG | SYSTOLIC BLOOD PRESSURE: 118 MMHG | BODY MASS INDEX: 30.76 KG/M2 | WEIGHT: 196 LBS | HEIGHT: 67 IN

## 2025-01-03 DIAGNOSIS — Z3A.37 37 WEEKS GESTATION OF PREGNANCY: ICD-10-CM

## 2025-01-03 DIAGNOSIS — O09.529 SUPERVISION OF ELDERLY MULTIGRAVIDA, ANTEPARTUM: Primary | ICD-10-CM

## 2025-01-03 LAB — GBS, EXTERNAL RESULT: NEGATIVE

## 2025-01-03 PROCEDURE — 0502F SUBSEQUENT PRENATAL CARE: CPT | Performed by: OBSTETRICS & GYNECOLOGY

## 2025-01-03 NOTE — PROGRESS NOTES
Baby moving  Plans to TOLAC and wait for spontaneous labor  Cervix closed  GBS done  Disc labor precautions    Ultrasound Result (most recent):  US OB FOLLOW UP TRANSABDOMINAL APPROACH 01/03/2025    Narrative  AC/BPD/FL/HC 3121g (7eg81ui) ± 456g 44.8%  2D Measurements AUA Value m1 m2 m3 Meth. GP GA  BPD ([facility]) 9.01 cm 9.01 avg. 36.9% 36w3d  OFD (HC) 12.34 cm 12.34 avg.  HC ([facility]) 34.13 cm 34.13 avg. 67.2% 39w2d  AC ([facility]) 32.52 cm 32.57 32.47 avg. 29.9% 36w3d  FL ([facility]) 7.40 cm 7.40 avg. 55.6% 37w6d  2D Measurements Value m1 m2 m3 m4 m5 m6 Meth.  OC  Q1 5.12 cm 5.12 avg.  Q2 4.12 cm 4.12 avg.  Q3 2.08 cm 2.08 avg.  Q4 3.37 cm 3.37 avg.  OC 14.69 cm 14.69    Fetal Heart Rate  Ventricular  bpm 155 avg.  Fetal Anatomy  Thorax  Diaphragm normal  Heart  4-chamber view normal  Cardiac rhythm regular (normal)  Abdomen  Stomach normal  Kidneys normal  Bladder normal  Placenta  Placenta Location posterior  General  Fetal Position cephalic  Amniotic Fluid normal amount    Limited OB scan was performed.  Single vertex 37-week 5-day IUP is seen with normal cardiac of the shin.  Limited fetal anatomy is seen and appears normal.  Appropriate fetal growth is seen size equals dates.  OC and placenta appear normal

## 2025-01-05 LAB
GP B STREP DNA SPEC QL NAA+PROBE: NEGATIVE
SPECIMEN SOURCE: NORMAL

## 2025-01-16 ENCOUNTER — TELEPHONE (OUTPATIENT)
Age: 35
End: 2025-01-16

## 2025-01-16 NOTE — TELEPHONE ENCOUNTER
PT was called back by RN, relayed  was aware and sent the message to TS/scheduling and she will discuss tomorrow at the PT's ov.  PT verbalizes understanding.

## 2025-01-17 ENCOUNTER — ROUTINE PRENATAL (OUTPATIENT)
Age: 35
End: 2025-01-17

## 2025-01-17 VITALS
WEIGHT: 196.6 LBS | HEART RATE: 80 BPM | DIASTOLIC BLOOD PRESSURE: 76 MMHG | SYSTOLIC BLOOD PRESSURE: 112 MMHG | BODY MASS INDEX: 30.79 KG/M2

## 2025-01-17 DIAGNOSIS — Z3A.39 39 WEEKS GESTATION OF PREGNANCY: ICD-10-CM

## 2025-01-17 DIAGNOSIS — O09.529 SUPERVISION OF ELDERLY MULTIGRAVIDA, ANTEPARTUM: Primary | ICD-10-CM

## 2025-01-17 PROCEDURE — 0502F SUBSEQUENT PRENATAL CARE: CPT | Performed by: OBSTETRICS & GYNECOLOGY

## 2025-01-17 NOTE — PROGRESS NOTES
Baby moving  Vertex not engaged - above pubis    Plan cook next week with IOL  Disc risk of labor, possible cook won't work or baby could flip  Patient questions addressed

## 2025-01-22 ENCOUNTER — HOSPITAL ENCOUNTER (INPATIENT)
Facility: HOSPITAL | Age: 35
LOS: 3 days | Discharge: HOME | End: 2025-01-25
Attending: OBSTETRICS & GYNECOLOGY | Admitting: OBSTETRICS & GYNECOLOGY
Payer: COMMERCIAL

## 2025-01-22 ENCOUNTER — ROUTINE PRENATAL (OUTPATIENT)
Age: 35
End: 2025-01-22
Payer: COMMERCIAL

## 2025-01-22 VITALS
BODY MASS INDEX: 31.23 KG/M2 | RESPIRATION RATE: 20 BRPM | HEIGHT: 67 IN | SYSTOLIC BLOOD PRESSURE: 118 MMHG | DIASTOLIC BLOOD PRESSURE: 77 MMHG | HEART RATE: 102 BPM | WEIGHT: 199 LBS

## 2025-01-22 DIAGNOSIS — G89.18 POST-OP PAIN: Primary | ICD-10-CM

## 2025-01-22 DIAGNOSIS — O09.529 SUPERVISION OF ELDERLY MULTIGRAVIDA, ANTEPARTUM: Primary | ICD-10-CM

## 2025-01-22 DIAGNOSIS — Z3A.40 40 WEEKS GESTATION OF PREGNANCY: ICD-10-CM

## 2025-01-22 PROBLEM — O48.0 POST TERM PREGNANCY OVER 40 WEEKS: Status: ACTIVE | Noted: 2025-01-22

## 2025-01-22 PROBLEM — Z34.90 ENCOUNTER FOR INDUCTION OF LABOR: Status: ACTIVE | Noted: 2025-01-22

## 2025-01-22 LAB
ABO + RH BLD: NORMAL
BASOPHILS # BLD: 0.02 K/UL (ref 0–0.1)
BASOPHILS NFR BLD: 0.3 % (ref 0–1)
BLOOD GROUP ANTIBODIES SERPL: NORMAL
DIFFERENTIAL METHOD BLD: ABNORMAL
EOSINOPHIL # BLD: 0.04 K/UL (ref 0–0.4)
EOSINOPHIL NFR BLD: 0.6 % (ref 0–7)
ERYTHROCYTE [DISTWIDTH] IN BLOOD BY AUTOMATED COUNT: 12 % (ref 11.5–14.5)
HCT VFR BLD AUTO: 32.9 % (ref 35–47)
HGB BLD-MCNC: 11.3 G/DL (ref 11.5–16)
IMM GRANULOCYTES # BLD AUTO: 0.01 K/UL (ref 0–0.04)
IMM GRANULOCYTES NFR BLD AUTO: 0.1 % (ref 0–0.5)
LYMPHOCYTES # BLD: 2.29 K/UL (ref 0.8–3.5)
LYMPHOCYTES NFR BLD: 33 % (ref 12–49)
MCH RBC QN AUTO: 31.7 PG (ref 26–34)
MCHC RBC AUTO-ENTMCNC: 34.3 G/DL (ref 30–36.5)
MCV RBC AUTO: 92.2 FL (ref 80–99)
MONOCYTES # BLD: 0.64 K/UL (ref 0–1)
MONOCYTES NFR BLD: 9.2 % (ref 5–13)
NEUTS SEG # BLD: 3.94 K/UL (ref 1.8–8)
NEUTS SEG NFR BLD: 56.8 % (ref 32–75)
NRBC # BLD: 0 K/UL (ref 0–0.01)
NRBC BLD-RTO: 0 PER 100 WBC
PLATELET # BLD AUTO: 207 K/UL (ref 150–400)
PMV BLD AUTO: 9.3 FL (ref 8.9–12.9)
RBC # BLD AUTO: 3.57 M/UL (ref 3.8–5.2)
SPECIMEN EXP DATE BLD: NORMAL
WBC # BLD AUTO: 6.9 K/UL (ref 3.6–11)

## 2025-01-22 PROCEDURE — 86900 BLOOD TYPING SEROLOGIC ABO: CPT

## 2025-01-22 PROCEDURE — 86780 TREPONEMA PALLIDUM: CPT

## 2025-01-22 PROCEDURE — 1100000000 HC RM PRIVATE

## 2025-01-22 PROCEDURE — 0502F SUBSEQUENT PRENATAL CARE: CPT | Performed by: OBSTETRICS & GYNECOLOGY

## 2025-01-22 PROCEDURE — 4A1HXCZ MONITORING OF PRODUCTS OF CONCEPTION, CARDIAC RATE, EXTERNAL APPROACH: ICD-10-PCS | Performed by: OBSTETRICS & GYNECOLOGY

## 2025-01-22 PROCEDURE — 3E033VJ INTRODUCTION OF OTHER HORMONE INTO PERIPHERAL VEIN, PERCUTANEOUS APPROACH: ICD-10-PCS | Performed by: OBSTETRICS & GYNECOLOGY

## 2025-01-22 PROCEDURE — 6360000002 HC RX W HCPCS: Performed by: OBSTETRICS & GYNECOLOGY

## 2025-01-22 PROCEDURE — 85025 COMPLETE CBC W/AUTO DIFF WBC: CPT

## 2025-01-22 PROCEDURE — 86901 BLOOD TYPING SEROLOGIC RH(D): CPT

## 2025-01-22 PROCEDURE — 36415 COLL VENOUS BLD VENIPUNCTURE: CPT

## 2025-01-22 PROCEDURE — 86850 RBC ANTIBODY SCREEN: CPT

## 2025-01-22 PROCEDURE — 59200 INSERT CERVICAL DILATOR: CPT | Performed by: OBSTETRICS & GYNECOLOGY

## 2025-01-22 PROCEDURE — 2500000003 HC RX 250 WO HCPCS: Performed by: OBSTETRICS & GYNECOLOGY

## 2025-01-22 RX ORDER — HYDROMORPHONE HYDROCHLORIDE 1 MG/ML
1 INJECTION, SOLUTION INTRAMUSCULAR; INTRAVENOUS; SUBCUTANEOUS ONCE
Status: COMPLETED | OUTPATIENT
Start: 2025-01-22 | End: 2025-01-22

## 2025-01-22 RX ORDER — FAMOTIDINE 20 MG/1
20 TABLET, FILM COATED ORAL DAILY
COMMUNITY

## 2025-01-22 RX ORDER — PROCHLORPERAZINE EDISYLATE 5 MG/ML
10 INJECTION INTRAMUSCULAR; INTRAVENOUS EVERY 6 HOURS PRN
Status: DISCONTINUED | OUTPATIENT
Start: 2025-01-22 | End: 2025-01-23

## 2025-01-22 RX ORDER — FERROUS SULFATE 325(65) MG
325 TABLET ORAL
COMMUNITY

## 2025-01-22 RX ADMIN — PROCHLORPERAZINE EDISYLATE 10 MG: 5 INJECTION INTRAMUSCULAR; INTRAVENOUS at 20:51

## 2025-01-22 RX ADMIN — HYDROMORPHONE HYDROCHLORIDE 1 MG: 1 INJECTION, SOLUTION INTRAMUSCULAR; INTRAVENOUS; SUBCUTANEOUS at 20:54

## 2025-01-22 ASSESSMENT — PAIN DESCRIPTION - ORIENTATION: ORIENTATION: OUTER

## 2025-01-22 ASSESSMENT — PAIN DESCRIPTION - DESCRIPTORS: DESCRIPTORS: CRAMPING

## 2025-01-22 ASSESSMENT — PAIN - FUNCTIONAL ASSESSMENT: PAIN_FUNCTIONAL_ASSESSMENT: ACTIVITIES ARE NOT PREVENTED

## 2025-01-22 ASSESSMENT — PAIN DESCRIPTION - LOCATION: LOCATION: ABDOMEN;PELVIS

## 2025-01-22 ASSESSMENT — PAIN SCALES - GENERAL: PAINLEVEL_OUTOF10: 5

## 2025-01-22 NOTE — PROGRESS NOTES
Cervical Catheter insertion procedure note    Lia May is a ,  35 y.o. female who presents today far placement of a cervical catheter in the cervix for ripening. Her cervix is unfavorable and she is scheduled for induction tomorrow.   She has elected to have a cervical catheter placement today. The risks, benefits and assets of the procedure were discussed. Her questions were answered.   PROCEDURE: The vagina and cervix was prepped with Zephiran. A Cook catheter was placed through the cervix without difficulty. The uterine bulb was inflated with 890 cc of saline. The cervical balloon was inflated to 80 cc of saline.  Bleeding was minimal. The patient's level of discomfort was minimal.   POST PROCEDURE: The patient tolerated the procedure well. There were no complications.   She was  admitted as an outpatient for monitoring overnight.      Once again discussed the risk of induction of labor with TOLAC.  Patient strongly desires trial of labor as she intends to go retrieve her final embryo and would like to do this sooner than later.

## 2025-01-22 NOTE — H&P
History & Physical    Name: Lia May MRN: [id number]  SSN: [id number]    YOB: 1990  Age: 35 y.o.  Sex: female        Subjective:     Estimated Date of Delivery: 25  OB History          2    Para   1    Term   1            AB        Living   1         SAB        IAB        Ectopic        Molar        Multiple        Live Births   1                Ms. May is admitted with pregnancy at 40w3d for induction of labor. Prenatal course was complicated by  AMA, prior LTCS, postterm, IVF pregnancy . Please see prenatal records for details.    Patient Active Problem List    Diagnosis Date Noted    Post term pregnancy over 40 weeks 2025    Supervision of elderly multigravida, antepartum 2024     Overview Note:     Intrauterine pregnancy with the following problems identified:   EDC 2025  IVF/FET   Euploid blastocyst  NIPTS- normal female  Carrier screening - FOB was negative  AMA 37 at delivery  Baby ASA  Hx of LTCS - repeat at term  CHTN  Baseline PCR 0.2   Hypothyroidism - check TSH next visit Syntroid 100mcg  Depression - declines medication at this time  Declines tdap         Hypertension affecting pregnancy 2023    Murmur, cardiac 2023         Past Medical History:   Diagnosis Date    Anemia     Anxiety     Autoimmune disorder (HCC) 2009    Hashimoto's thyroiditis     Hypertension affecting pregnancy 2023    Hypothyroidism 2009    Infertility, female 2017    Pre-eclampsia 2023    Routine Papanicolaou smear 2023    NILM, HPV negative     Past Surgical History:   Procedure Laterality Date     SECTION       Social History     Occupational History    Not on file   Tobacco Use    Smoking status: Never    Smokeless tobacco: Never   Vaping Use    Vaping status: Not on file   Substance and Sexual Activity    Alcohol use: Not Currently    Drug use: Never    Sexual activity: Yes     Partners: Male     Birth  control/protection: None     Comment: Spouse     Family History   Problem Relation Age of Onset    Heart Attack Father     Other Father     Other Maternal Grandmother     Other Maternal Grandfather     Stroke Maternal Grandfather     Heart Disease Maternal Grandfather     Hypertension Mother     Diabetes Paternal Grandmother     Anxiety Disorder Paternal Grandmother     Other Paternal Grandfather     Cancer Paternal Grandfather        No Known Allergies  Prior to Admission medications    Medication Sig Start Date End Date Taking? Authorizing Provider   famotidine (PEPCID) 20 MG tablet Take 1 tablet by mouth daily   Yes Pricila Arreola MD   ferrous sulfate (IRON 325) 325 (65 Fe) MG tablet Take 1 tablet by mouth daily (with breakfast)   Yes Pricila Arreola MD   levothyroxine (SYNTHROID) 100 MCG tablet Take 1 tablet by mouth daily 10/10/24  Yes Roselia Holm MD   Docosahexaenoic Acid (DHA PO) Take by mouth   Yes Pricila Arreola MD   Probiotic Product (PROBIOTIC PO) Take by mouth   Yes Pricila Arreola MD   Prenatal Vit-Fe Fumarate-FA (PRENATAL VITAMIN) 27-0.8 MG TABS Take 1 tablet by mouth daily 7/1/24  Yes Roselia Holm MD        Review of Systems: Pertinent items are noted in HPI.    Objective:     Vitals:  Vitals:    01/22/25 1650   BP: (!) 142/86   Pulse: 68   Resp: 17   Temp: 98.2 °F (36.8 °C)   TempSrc: Oral   SpO2: 97%   Weight: 90.3 kg (199 lb)   Height: 1.702 m (5' 7\")        Physical Exam:  regular rate and rhythm, no murmurs  Clear To Auscultation bilaterally - no wheezes, rales, rhonchi, or crackles  Cervix closed  Membranes:  Intact  Fetal Heart Rate: Reactive      Results for orders placed or performed during the hospital encounter of 01/22/25   CBC with Auto Differential   Result Value Ref Range    WBC 6.9 3.6 - 11.0 K/uL    RBC 3.57 (L) 3.80 - 5.20 M/uL    Hemoglobin 11.3 (L) 11.5 - 16.0 g/dL    Hematocrit 32.9 (L) 35.0 - 47.0 %    MCV 92.2 80.0 - 99.0 FL    MCH 31.7 26.0 - 34.0

## 2025-01-23 ENCOUNTER — ANESTHESIA (OUTPATIENT)
Facility: HOSPITAL | Age: 35
End: 2025-01-23
Payer: COMMERCIAL

## 2025-01-23 ENCOUNTER — ANESTHESIA EVENT (OUTPATIENT)
Facility: HOSPITAL | Age: 35
End: 2025-01-23
Payer: COMMERCIAL

## 2025-01-23 PROCEDURE — 2580000003 HC RX 258: Performed by: ADVANCED PRACTICE MIDWIFE

## 2025-01-23 PROCEDURE — 7100000000 HC PACU RECOVERY - FIRST 15 MIN: Performed by: OBSTETRICS & GYNECOLOGY

## 2025-01-23 PROCEDURE — 6360000002 HC RX W HCPCS: Performed by: OBSTETRICS & GYNECOLOGY

## 2025-01-23 PROCEDURE — 2580000003 HC RX 258: Performed by: OBSTETRICS & GYNECOLOGY

## 2025-01-23 PROCEDURE — 51702 INSERT TEMP BLADDER CATH: CPT

## 2025-01-23 PROCEDURE — 2709999900 HC NON-CHARGEABLE SUPPLY: Performed by: OBSTETRICS & GYNECOLOGY

## 2025-01-23 PROCEDURE — 7100000001 HC PACU RECOVERY - ADDTL 15 MIN: Performed by: OBSTETRICS & GYNECOLOGY

## 2025-01-23 PROCEDURE — 3609079900 HC CESAREAN SECTION: Performed by: OBSTETRICS & GYNECOLOGY

## 2025-01-23 PROCEDURE — 2500000003 HC RX 250 WO HCPCS: Performed by: NURSE ANESTHETIST, CERTIFIED REGISTERED

## 2025-01-23 PROCEDURE — 6370000000 HC RX 637 (ALT 250 FOR IP): Performed by: OBSTETRICS & GYNECOLOGY

## 2025-01-23 PROCEDURE — 3700000001 HC ADD 15 MINUTES (ANESTHESIA): Performed by: OBSTETRICS & GYNECOLOGY

## 2025-01-23 PROCEDURE — 6360000002 HC RX W HCPCS: Performed by: ADVANCED PRACTICE MIDWIFE

## 2025-01-23 PROCEDURE — 3700000000 HC ANESTHESIA ATTENDED CARE: Performed by: OBSTETRICS & GYNECOLOGY

## 2025-01-23 PROCEDURE — 2720000010 HC SURG SUPPLY STERILE: Performed by: OBSTETRICS & GYNECOLOGY

## 2025-01-23 PROCEDURE — 6360000002 HC RX W HCPCS: Performed by: NURSE ANESTHETIST, CERTIFIED REGISTERED

## 2025-01-23 PROCEDURE — 94761 N-INVAS EAR/PLS OXIMETRY MLT: CPT

## 2025-01-23 PROCEDURE — 2500000003 HC RX 250 WO HCPCS: Performed by: OBSTETRICS & GYNECOLOGY

## 2025-01-23 PROCEDURE — 1120000000 HC RM PRIVATE OB

## 2025-01-23 RX ORDER — METHYLERGONOVINE MALEATE 0.2 MG/ML
200 INJECTION INTRAVENOUS PRN
Status: DISCONTINUED | OUTPATIENT
Start: 2025-01-23 | End: 2025-01-25 | Stop reason: HOSPADM

## 2025-01-23 RX ORDER — MORPHINE SULFATE 1 MG/ML
INJECTION, SOLUTION EPIDURAL; INTRATHECAL; INTRAVENOUS
Status: DISCONTINUED | OUTPATIENT
Start: 2025-01-23 | End: 2025-01-23 | Stop reason: SDUPTHER

## 2025-01-23 RX ORDER — HYDROMORPHONE HYDROCHLORIDE 1 MG/ML
0.5 INJECTION, SOLUTION INTRAMUSCULAR; INTRAVENOUS; SUBCUTANEOUS EVERY 6 HOURS PRN
Status: ACTIVE | OUTPATIENT
Start: 2025-01-23 | End: 2025-01-24

## 2025-01-23 RX ORDER — FERROUS SULFATE 325(65) MG
325 TABLET ORAL 2 TIMES DAILY WITH MEALS
Status: DISCONTINUED | OUTPATIENT
Start: 2025-01-23 | End: 2025-01-23

## 2025-01-23 RX ORDER — LEVOTHYROXINE SODIUM 25 UG/1
100 TABLET ORAL
Status: DISCONTINUED | OUTPATIENT
Start: 2025-01-23 | End: 2025-01-23

## 2025-01-23 RX ORDER — MISOPROSTOL 200 UG/1
800 TABLET ORAL PRN
Status: DISCONTINUED | OUTPATIENT
Start: 2025-01-23 | End: 2025-01-25 | Stop reason: HOSPADM

## 2025-01-23 RX ORDER — OXYCODONE HYDROCHLORIDE 5 MG/1
10 TABLET ORAL EVERY 4 HOURS PRN
Status: ACTIVE | OUTPATIENT
Start: 2025-01-23 | End: 2025-01-24

## 2025-01-23 RX ORDER — BUPIVACAINE HYDROCHLORIDE 7.5 MG/ML
INJECTION, SOLUTION INTRASPINAL
Status: DISCONTINUED | OUTPATIENT
Start: 2025-01-23 | End: 2025-01-23 | Stop reason: SDUPTHER

## 2025-01-23 RX ORDER — SODIUM CHLORIDE 0.9 % (FLUSH) 0.9 %
5-40 SYRINGE (ML) INJECTION EVERY 12 HOURS SCHEDULED
Status: DISCONTINUED | OUTPATIENT
Start: 2025-01-23 | End: 2025-01-25 | Stop reason: HOSPADM

## 2025-01-23 RX ORDER — OXYCODONE HYDROCHLORIDE 5 MG/1
5 TABLET ORAL EVERY 4 HOURS PRN
Status: DISPENSED | OUTPATIENT
Start: 2025-01-23 | End: 2025-01-24

## 2025-01-23 RX ORDER — HYDROMORPHONE HYDROCHLORIDE 1 MG/ML
0.25 INJECTION, SOLUTION INTRAMUSCULAR; INTRAVENOUS; SUBCUTANEOUS EVERY 6 HOURS PRN
Status: ACTIVE | OUTPATIENT
Start: 2025-01-23 | End: 2025-01-24

## 2025-01-23 RX ORDER — LEVOTHYROXINE SODIUM 100 UG/1
100 TABLET ORAL
Status: DISCONTINUED | OUTPATIENT
Start: 2025-01-23 | End: 2025-01-25 | Stop reason: HOSPADM

## 2025-01-23 RX ORDER — NALOXONE HYDROCHLORIDE 0.4 MG/ML
INJECTION, SOLUTION INTRAMUSCULAR; INTRAVENOUS; SUBCUTANEOUS PRN
Status: ACTIVE | OUTPATIENT
Start: 2025-01-23 | End: 2025-01-24

## 2025-01-23 RX ORDER — FAMOTIDINE 10 MG/ML
INJECTION, SOLUTION INTRAVENOUS
Status: DISCONTINUED | OUTPATIENT
Start: 2025-01-23 | End: 2025-01-23 | Stop reason: SDUPTHER

## 2025-01-23 RX ORDER — KETOROLAC TROMETHAMINE 30 MG/ML
30 INJECTION, SOLUTION INTRAMUSCULAR; INTRAVENOUS EVERY 6 HOURS
Status: DISPENSED | OUTPATIENT
Start: 2025-01-23 | End: 2025-01-24

## 2025-01-23 RX ORDER — SODIUM CHLORIDE 9 MG/ML
INJECTION, SOLUTION INTRAVENOUS PRN
Status: DISCONTINUED | OUTPATIENT
Start: 2025-01-23 | End: 2025-01-25 | Stop reason: HOSPADM

## 2025-01-23 RX ORDER — SWAB
1 SWAB, NON-MEDICATED MISCELLANEOUS DAILY
Status: DISCONTINUED | OUTPATIENT
Start: 2025-01-23 | End: 2025-01-25 | Stop reason: HOSPADM

## 2025-01-23 RX ORDER — FAMOTIDINE 20 MG/1
20 TABLET, FILM COATED ORAL 2 TIMES DAILY PRN
Status: DISCONTINUED | OUTPATIENT
Start: 2025-01-23 | End: 2025-01-25 | Stop reason: HOSPADM

## 2025-01-23 RX ORDER — SODIUM CHLORIDE 0.9 % (FLUSH) 0.9 %
5-40 SYRINGE (ML) INJECTION PRN
Status: DISCONTINUED | OUTPATIENT
Start: 2025-01-23 | End: 2025-01-25 | Stop reason: HOSPADM

## 2025-01-23 RX ORDER — CARBOPROST TROMETHAMINE 250 UG/ML
250 INJECTION, SOLUTION INTRAMUSCULAR PRN
Status: DISCONTINUED | OUTPATIENT
Start: 2025-01-23 | End: 2025-01-25 | Stop reason: HOSPADM

## 2025-01-23 RX ORDER — FENTANYL CITRATE 50 UG/ML
INJECTION, SOLUTION INTRAMUSCULAR; INTRAVENOUS
Status: DISCONTINUED | OUTPATIENT
Start: 2025-01-23 | End: 2025-01-23 | Stop reason: SDUPTHER

## 2025-01-23 RX ORDER — OXYCODONE HYDROCHLORIDE 5 MG/1
10 TABLET ORAL EVERY 4 HOURS PRN
Status: DISCONTINUED | OUTPATIENT
Start: 2025-01-24 | End: 2025-01-25 | Stop reason: HOSPADM

## 2025-01-23 RX ORDER — SENNA AND DOCUSATE SODIUM 50; 8.6 MG/1; MG/1
1 TABLET, FILM COATED ORAL DAILY
Status: DISCONTINUED | OUTPATIENT
Start: 2025-01-23 | End: 2025-01-25 | Stop reason: HOSPADM

## 2025-01-23 RX ORDER — ONDANSETRON 4 MG/1
4 TABLET, ORALLY DISINTEGRATING ORAL EVERY 8 HOURS PRN
Status: DISCONTINUED | OUTPATIENT
Start: 2025-01-23 | End: 2025-01-25 | Stop reason: HOSPADM

## 2025-01-23 RX ORDER — TRANEXAMIC ACID 10 MG/ML
1000 INJECTION, SOLUTION INTRAVENOUS
Status: ACTIVE | OUTPATIENT
Start: 2025-01-23 | End: 2025-01-24

## 2025-01-23 RX ORDER — PHENYLEPHRINE HCL IN 0.9% NACL 0.4MG/10ML
SYRINGE (ML) INTRAVENOUS
Status: DISCONTINUED | OUTPATIENT
Start: 2025-01-23 | End: 2025-01-23 | Stop reason: SDUPTHER

## 2025-01-23 RX ORDER — ACETAMINOPHEN 500 MG
1000 TABLET ORAL EVERY 8 HOURS SCHEDULED
Status: DISCONTINUED | OUTPATIENT
Start: 2025-01-23 | End: 2025-01-25 | Stop reason: HOSPADM

## 2025-01-23 RX ORDER — OXYCODONE HYDROCHLORIDE 5 MG/1
5 TABLET ORAL EVERY 4 HOURS PRN
Status: DISCONTINUED | OUTPATIENT
Start: 2025-01-24 | End: 2025-01-25 | Stop reason: HOSPADM

## 2025-01-23 RX ORDER — KETOROLAC TROMETHAMINE 30 MG/ML
30 INJECTION, SOLUTION INTRAMUSCULAR; INTRAVENOUS EVERY 6 HOURS PRN
Status: CANCELLED | OUTPATIENT
Start: 2025-01-23 | End: 2025-01-28

## 2025-01-23 RX ORDER — KETOROLAC TROMETHAMINE 30 MG/ML
30 INJECTION, SOLUTION INTRAMUSCULAR; INTRAVENOUS EVERY 6 HOURS PRN
Status: DISCONTINUED | OUTPATIENT
Start: 2025-01-23 | End: 2025-01-23 | Stop reason: SDUPTHER

## 2025-01-23 RX ORDER — SODIUM CHLORIDE, SODIUM LACTATE, POTASSIUM CHLORIDE, CALCIUM CHLORIDE 600; 310; 30; 20 MG/100ML; MG/100ML; MG/100ML; MG/100ML
INJECTION, SOLUTION INTRAVENOUS CONTINUOUS
Status: DISCONTINUED | OUTPATIENT
Start: 2025-01-23 | End: 2025-01-25 | Stop reason: HOSPADM

## 2025-01-23 RX ORDER — EPHEDRINE SULFATE/0.9% NACL/PF 50 MG/5 ML
SYRINGE (ML) INTRAVENOUS
Status: DISCONTINUED | OUTPATIENT
Start: 2025-01-23 | End: 2025-01-23 | Stop reason: SDUPTHER

## 2025-01-23 RX ORDER — IBUPROFEN 800 MG/1
800 TABLET, FILM COATED ORAL EVERY 8 HOURS
Status: DISCONTINUED | OUTPATIENT
Start: 2025-01-24 | End: 2025-01-25 | Stop reason: HOSPADM

## 2025-01-23 RX ORDER — SODIUM CHLORIDE, SODIUM LACTATE, POTASSIUM CHLORIDE, CALCIUM CHLORIDE 600; 310; 30; 20 MG/100ML; MG/100ML; MG/100ML; MG/100ML
INJECTION, SOLUTION INTRAVENOUS CONTINUOUS
Status: DISCONTINUED | OUTPATIENT
Start: 2025-01-23 | End: 2025-01-23

## 2025-01-23 RX ORDER — ONDANSETRON 2 MG/ML
INJECTION INTRAMUSCULAR; INTRAVENOUS
Status: DISCONTINUED | OUTPATIENT
Start: 2025-01-23 | End: 2025-01-23 | Stop reason: SDUPTHER

## 2025-01-23 RX ORDER — OXYTOCIN 10 [USP'U]/ML
INJECTION, SOLUTION INTRAMUSCULAR; INTRAVENOUS
Status: DISCONTINUED | OUTPATIENT
Start: 2025-01-23 | End: 2025-01-23 | Stop reason: SDUPTHER

## 2025-01-23 RX ORDER — ONDANSETRON 2 MG/ML
4 INJECTION INTRAMUSCULAR; INTRAVENOUS EVERY 6 HOURS PRN
Status: DISCONTINUED | OUTPATIENT
Start: 2025-01-23 | End: 2025-01-25 | Stop reason: HOSPADM

## 2025-01-23 RX ORDER — SIMETHICONE 80 MG
80 TABLET,CHEWABLE ORAL EVERY 6 HOURS PRN
Status: DISCONTINUED | OUTPATIENT
Start: 2025-01-23 | End: 2025-01-25 | Stop reason: HOSPADM

## 2025-01-23 RX ADMIN — Medication 10 MG: at 15:43

## 2025-01-23 RX ADMIN — FENTANYL CITRATE 15 MCG: 50 INJECTION, SOLUTION INTRAMUSCULAR; INTRAVENOUS at 15:21

## 2025-01-23 RX ADMIN — Medication 40 MCG: at 15:22

## 2025-01-23 RX ADMIN — Medication 10 MG: at 15:36

## 2025-01-23 RX ADMIN — BUPIVACAINE HYDROCHLORIDE IN DEXTROSE 1.6 ML: 7.5 INJECTION, SOLUTION SUBARACHNOID at 15:21

## 2025-01-23 RX ADMIN — ONDANSETRON 4 MG: 2 INJECTION, SOLUTION INTRAMUSCULAR; INTRAVENOUS at 15:19

## 2025-01-23 RX ADMIN — SODIUM CHLORIDE, POTASSIUM CHLORIDE, SODIUM LACTATE AND CALCIUM CHLORIDE: 600; 310; 30; 20 INJECTION, SOLUTION INTRAVENOUS at 18:29

## 2025-01-23 RX ADMIN — SODIUM CHLORIDE, POTASSIUM CHLORIDE, SODIUM LACTATE AND CALCIUM CHLORIDE: 600; 310; 30; 20 INJECTION, SOLUTION INTRAVENOUS at 16:02

## 2025-01-23 RX ADMIN — LEVOTHYROXINE SODIUM 100 MCG: 0.03 TABLET ORAL at 07:31

## 2025-01-23 RX ADMIN — OXYTOCIN 30 UNITS: 10 INJECTION, SOLUTION INTRAMUSCULAR; INTRAVENOUS at 15:59

## 2025-01-23 RX ADMIN — MORPHINE SULFATE 0.2 MG: 1 INJECTION, SOLUTION EPIDURAL; INTRATHECAL; INTRAVENOUS at 15:21

## 2025-01-23 RX ADMIN — KETOROLAC TROMETHAMINE 30 MG: 30 INJECTION, SOLUTION INTRAMUSCULAR at 17:32

## 2025-01-23 RX ADMIN — FAMOTIDINE 20 MG: 10 INJECTION, SOLUTION INTRAVENOUS at 15:19

## 2025-01-23 RX ADMIN — SODIUM CHLORIDE, POTASSIUM CHLORIDE, SODIUM LACTATE AND CALCIUM CHLORIDE: 600; 310; 30; 20 INJECTION, SOLUTION INTRAVENOUS at 07:26

## 2025-01-23 RX ADMIN — CEFAZOLIN SODIUM 2000 MG: 1 POWDER, FOR SOLUTION INTRAMUSCULAR; INTRAVENOUS at 15:30

## 2025-01-23 RX ADMIN — FERROUS SULFATE TAB 325 MG (65 MG ELEMENTAL FE) 325 MG: 325 (65 FE) TAB at 07:31

## 2025-01-23 RX ADMIN — SODIUM CHLORIDE, POTASSIUM CHLORIDE, SODIUM LACTATE AND CALCIUM CHLORIDE: 600; 310; 30; 20 INJECTION, SOLUTION INTRAVENOUS at 13:25

## 2025-01-23 RX ADMIN — ACETAMINOPHEN 1000 MG: 500 TABLET ORAL at 23:59

## 2025-01-23 RX ADMIN — OXYTOCIN 2 MILLI-UNITS/MIN: 10 INJECTION, SOLUTION INTRAMUSCULAR; INTRAVENOUS at 03:49

## 2025-01-23 ASSESSMENT — PAIN SCALES - GENERAL
PAINLEVEL_OUTOF10: 2
PAINLEVEL_OUTOF10: 2

## 2025-01-23 ASSESSMENT — PAIN DESCRIPTION - DESCRIPTORS: DESCRIPTORS: ACHING

## 2025-01-23 ASSESSMENT — PAIN DESCRIPTION - LOCATION: LOCATION: ABDOMEN

## 2025-01-23 ASSESSMENT — PAIN - FUNCTIONAL ASSESSMENT: PAIN_FUNCTIONAL_ASSESSMENT: ACTIVITIES ARE NOT PREVENTED

## 2025-01-23 ASSESSMENT — PAIN DESCRIPTION - ORIENTATION: ORIENTATION: LOWER;MID

## 2025-01-23 NOTE — ANESTHESIA POSTPROCEDURE EVALUATION
Department of Anesthesiology  Postprocedure Note    Patient: Lia May  MRN: [id number]  YOB: 1990  Date of evaluation: 2025    Procedure Summary       Date: 25 Room / Location: [facility] L&D  [facility] L&D OR    Anesthesia Start: 1515 Anesthesia Stop: 163    Procedure:  SECTION (Abdomen) Diagnosis:       Term pregnancy, repeat      (Term pregnancy, repeat [Z34.90])    Surgeons: Roselia Holm MD Responsible Provider: Raciel Smith MD    Anesthesia Type: Spinal ASA Status: 2            Anesthesia Type: Spinal    Stephanie Phase I: Stephanie Score: 9    Stephanie Phase II: Stephanie Score: 9    Anesthesia Post Evaluation    Patient location during evaluation: PACU  Patient participation: complete - patient participated  Level of consciousness: awake  Pain score: 0  Airway patency: patent  Nausea & Vomiting: no nausea and no vomiting  Cardiovascular status: blood pressure returned to baseline  Respiratory status: acceptable  Hydration status: euvolemic  Pain management: adequate    No notable events documented.

## 2025-01-23 NOTE — OP NOTE
Operative Note    Name: Lia May   Medical Record Number: [id number]   YOB: 1990  Today's Date: 2025      Pre-operative Diagnosis: Term pregnancy, repeat [Z34.90]    Post-operative Diagnosis:TLBFI, brow presentation    Operation: low transverse  section Procedure(s):   SECTION    Surgeon(s):  Roselia Holm MD    Anesthesia: Spinal    EBL: 750cc    Prophylactic Antibiotics: Ancef  DVT Prophylaxis: Sequential Compression Devices         Fetal Description: baker     Birth Information:   Information for the patient's :  Sondra May [[id number]]   @[id number]@    Umbilical Cord: Nuchal Cord x  1, also around arm, body    Placenta:  manual removal    Specimens: none           Complications:  none    Implants: none    Surgical Assistant: Neto Robles  Scrub Person First: Joan Mojica      Procedure Detail:      After proper patient identification and consent, the patient was taken to the operating room, where spinal anesthesia was administered and found to be adequate. Fernandes catheter had been placed using sterile technique.  The patient was prepped and draped in the normal sterile fashion.The abdomen was entered using the Pfannenstiel technique. The peritoneum was entered bluntely well superior to the bladder without any apparent injury. An Wilfrido retractor was placed.  Palpation revealed no bowel below the retractor. The bladder flap was created without difficulty. A low transverse uterine incision was made with the scalpel and extended with blunt finger dissection. Amniotomy was performed and the fluid was small amount clear.  The baby’s head elevated and the remainder of the infant was then delivered atraumatically. The nose and mouth were suctioned. The cord was clamped and cut and the baby was handed off to Nursing staff in attendance. The placenta was manually extracted. The uterus was wiped clean with a moist lap pad and

## 2025-01-23 NOTE — PROGRESS NOTES
2032: Pt calling out requesting pain meds.     2040: This RN calling Dr. Vyas. DESHAWN for 1mg Dilaudid and 10mg Compazine

## 2025-01-23 NOTE — L&D DELIVERY NOTE
Sondra May [[id number]]      Labor Events     Labor: No   Steroids: None  Cervical Ripening Date/Time:      Cervical Ripening Type: Fernandes/EASI  Antibiotics Received during Labor: No  Rupture Identifier: Sac 1  Rupture Date/Time:      Rupture Type: Intact  Induction: Cervical Ripening Balloon, Oxytocin  Augmentation: None  Labor Complications: Failure to Progress in First Stage       Anesthesia    Method: Spinal       Labor Event Times      Labor onset date/time:  25 16:14:00 EST     Dilation complete date/time:        Start pushing date/time:     Decision date/time (emergent ):  2025 07:53:00          Delivery Details      Delivery Date: 25 Delivery Time: 15:57:00   Delivery Type: , Low Transverse  Trial of Labor?: Yes   Categorization: Repeat   Priority: unscheduled  Indications for : Prior Uterine Surgery       Skin Incision Type: Low Transverse  Uterine Incision: Low Transverse       Hendricks Presentation    Presentation: Vertex       Shoulder Dystocia    Shoulder Dystocia Present?: No       Assisted Delivery Details    Forceps Attempted?: No  Vacuum Extractor Attempted?: No                           Cord    Vessels: 3 Vessels  Complications: Nuchal Loose  Cord Around: Head, Trunk  Delayed Cord Clamping?: Yes  Cord Clamped Date/Time: 2025 15:58:00  Cord Blood Disposition: Discard  Gases Sent?: No              Placenta    Date/Time: 2025 15:59:00  Removal: Expressed  Appearance: Intact  Disposition: Discarded       Lacerations    Episiotomy: None  Perineal Lacerations: None  Other Lacerations: no non-perineal laceration       Vaginal Counts    Initial Count Personnel: NA  Initial Count Verified By: NA  Final Count Personnel: NA  Final Count Verified By: NA       Blood Loss  Mother: Lia May #[id number]     Start of Mother's Information      Delivery Blood Loss   Intrapartum & Postpartum: 25 1614 -  25 1650    Delivery Admission: 25 1614 - 25 1650         Intrapartum & Postpartum Delivery Admission    Quantitative Blood Loss (mL) Hospital Encounter 930 grams 930 grams    Total  930 mL 930 mL               End of Mother's Information  Mother: Lia May #[id number]                Delivery Providers    Delivering clinician: Roselia Holm MD     Provider Role    Roselia Holm MD Obstetrician    Shabana Shi, CHEL Primary Nurse    Mciaela May RN Primary  Nurse    Joan Mojica Scrub Tech    Neto Robles Scrub Tech               Assessment    Living Status: Living        Skin Color:   Heart Rate:   Reflex Irritability:   Muscle Tone:   Respiratory Effort:   Total:            1 Minute:    0    2    2    2    2    8         5 Minute:    1    2    2    2    2    9                                        Apgars Assigned By: LETICIA MILLIGAN              Resuscitation    Method: Stimulation, Room Air, Bulb Suction             [place] Measurements

## 2025-01-23 NOTE — PROGRESS NOTES
0700: Bedside and Verbal shift change report given to  RN (oncoming nurse) by Cal RN (offgoing nurse). Report included the following information Nurse Handoff Report.     0750: Dr. Holm at bedside. SVE performed. Pt 1/high/long per MD. MD unable to AROM at this time. C/s suggested to pt. Pt agreeable to receive procedure. Risk reviewed with pt. Informed consent obtained.     0915: Pt off monitor to shower

## 2025-01-23 NOTE — ANESTHESIA PRE PROCEDURE
Department of Anesthesiology  Preprocedure Note       Name:  Lia May   Age:  35 y.o.  :  1990                                          MRN:  [id number]         Date:  2025      Surgeon: Surgeon(s):  Roselia Holm MD    Procedure: Procedure(s):   SECTION    Medications prior to admission:   Prior to Admission medications    Medication Sig Start Date End Date Taking? Authorizing Provider   famotidine (PEPCID) 20 MG tablet Take 1 tablet by mouth daily   Yes Pricila Arreola MD   ferrous sulfate (IRON 325) 325 (65 Fe) MG tablet Take 1 tablet by mouth daily (with breakfast)   Yes Pricila Arreola MD   levothyroxine (SYNTHROID) 100 MCG tablet Take 1 tablet by mouth daily 10/10/24  Yes Roselia Holm MD   Docosahexaenoic Acid (DHA PO) Take by mouth   Yes Pricila Arreola MD   Probiotic Product (PROBIOTIC PO) Take by mouth   Yes Pricila Arreola MD   Prenatal Vit-Fe Fumarate-FA (PRENATAL VITAMIN) 27-0.8 MG TABS Take 1 tablet by mouth daily 24  Yes Roselia Holm MD       Current medications:    Current Facility-Administered Medications   Medication Dose Route Frequency Provider Last Rate Last Admin   • oxytocin (PITOCIN) 30 units in 500 mL infusion  1-20 alejandro-units/min IntraVENous Continuous aHkeem Garrett APRN - PANKAJ   Stopped at 25 0753   • levothyroxine (SYNTHROID) tablet 100 mcg  100 mcg Oral QAM  Navin Vyas Jr., MD   100 mcg at 25 0731   • ferrous sulfate (IRON 325) tablet 325 mg  325 mg Oral BID  Navin Vyas Jr., MD   325 mg at 25 0731   • lactated ringers infusion   IntraVENous Continuous Roselia Holm MD 75 mL/hr at 25 1325 New Bag at 25 1325   • ceFAZolin (ANCEF) 2,000 mg in sterile water 20 mL IV syringe  2,000 mg IntraVENous Once Roselia Holm MD       • prochlorperazine (COMPAZINE) injection 10 mg  10 mg IntraVENous Q6H PRN Navin Vyas Jr., MD   10 mg at 25       Allergies:  No Known

## 2025-01-23 NOTE — PROGRESS NOTES
1130 This RN received bedside report and assumed care of pt.     1145 Dr. Holm contacted by this RN regarding pt desiring limited monitoring. MD approves pt to come off monitors until closer to scheduled .

## 2025-01-23 NOTE — ANESTHESIA PROCEDURE NOTES
Spinal Block    Patient location during procedure: pre-op  End time: 1/23/2025 3:22 PM  Reason for block: primary anesthetic  Staffing  Performed: resident/CRNA and anesthesiologist   Anesthesiologist: Christiano Pierre MD  Resident/CRNA: Kiera Walker APRN - CRNA  Performed by: Kiera Walker APRN - CRNA  Authorized by: Christiano Pierre MD    Spinal Block  Patient position: sitting  Prep: DuraPrep  Patient monitoring: cardiac monitor, continuous pulse ox, frequent blood pressure checks and oxygen  Approach: midline  Location: L3/L4  Provider prep: mask, sterile gloves and sterile gown  Local infiltration: lidocaine  Needle  Needle type: Pencan   Needle gauge: 24 G  Needle length: 4 in  Assessment  Sensory level: T4  Swirl obtained: Yes  CSF: clear  Attempts: 1  Hemodynamics: stable  Preanesthetic Checklist  Completed: patient identified, IV checked, site marked, risks and benefits discussed, surgical/procedural consents, equipment checked, pre-op evaluation, timeout performed, anesthesia consent given, oxygen available, monitors applied/VS acknowledged, fire risk safety assessment completed and verbalized and blood product R/B/A discussed and consented

## 2025-01-23 NOTE — PROGRESS NOTES
1641 Secure message sent to Dr. Holm to inquire about cervical ripening balloon deflation as patient is very uncomfortable with complaints of pain 7/10. Awaiting response.     1710 Verbal approval to deflate internal and external cervical ripening balloon to 60/60. Done at bedside.

## 2025-01-23 NOTE — PROGRESS NOTES
0940: Bedside and Verbal shift change report given to ANA Stephens RN (oncoming nurse) by Georgina RN (offgoing nurse). Report included the following information Nurse Handoff Report, Adult Overview, Intake/Output, MAR, Recent Results, and Med Rec Status.     1145: Bedside and Verbal shift change report given to Jose Guadalupe RN (oncoming nurse) by Angelo RN (offgoing nurse). Report included the following information Nurse Handoff Report, Adult Overview, Intake/Output, MAR, Recent Results, and Med Rec Status.

## 2025-01-23 NOTE — PROGRESS NOTES
Labor Progress Note  Patient seen, fetal heart rate and contraction pattern evaluated, patient examined.    Cook removed at 4 am  /77   Pulse (!) 103   Temp 98.3 °F (36.8 °C) (Oral)   Resp 16   Ht 1.702 m (5' 7\")   Wt 90.3 kg (199 lb)   SpO2 97%   BMI 31.17 kg/m²     Physical Exam:  Cervical Exam:  1 cm dilated    30% effaced    Floating station    Cervical Position: posterior  Membranes:  Intact  Uterine Activity: Frequency: Every 3-5 minutes  Fetal Heart Rate: Reactive    Assessment/Plan:  Cervix firm and one cm post cook. Vertex not applied and above symphysis.    Options disc. Continue pitocin, possible rest overnight  CS today.     Patient and  want to proceed with repeat CS today. R/B/A disc with patient.

## 2025-01-24 LAB
ERYTHROCYTE [DISTWIDTH] IN BLOOD BY AUTOMATED COUNT: 12.3 % (ref 11.5–14.5)
HCT VFR BLD AUTO: 25.4 % (ref 35–47)
HGB BLD-MCNC: 8.6 G/DL (ref 11.5–16)
MCH RBC QN AUTO: 31.7 PG (ref 26–34)
MCHC RBC AUTO-ENTMCNC: 33.9 G/DL (ref 30–36.5)
MCV RBC AUTO: 93.7 FL (ref 80–99)
NRBC # BLD: 0 K/UL (ref 0–0.01)
NRBC BLD-RTO: 0 PER 100 WBC
PLATELET # BLD AUTO: 129 K/UL (ref 150–400)
PMV BLD AUTO: 9.2 FL (ref 8.9–12.9)
RBC # BLD AUTO: 2.71 M/UL (ref 3.8–5.2)
T PALLIDUM AB SER QL IA: NON REACTIVE
WBC # BLD AUTO: 5.8 K/UL (ref 3.6–11)

## 2025-01-24 PROCEDURE — 6370000000 HC RX 637 (ALT 250 FOR IP): Performed by: OBSTETRICS & GYNECOLOGY

## 2025-01-24 PROCEDURE — 6370000000 HC RX 637 (ALT 250 FOR IP)

## 2025-01-24 PROCEDURE — 85027 COMPLETE CBC AUTOMATED: CPT

## 2025-01-24 PROCEDURE — 94761 N-INVAS EAR/PLS OXIMETRY MLT: CPT

## 2025-01-24 PROCEDURE — 36415 COLL VENOUS BLD VENIPUNCTURE: CPT

## 2025-01-24 PROCEDURE — 6370000000 HC RX 637 (ALT 250 FOR IP): Performed by: ANESTHESIOLOGY

## 2025-01-24 PROCEDURE — 1120000000 HC RM PRIVATE OB

## 2025-01-24 RX ORDER — OXYCODONE HYDROCHLORIDE 5 MG/1
5 TABLET ORAL EVERY 4 HOURS PRN
Qty: 20 TABLET | Refills: 0 | Status: SHIPPED | OUTPATIENT
Start: 2025-01-24 | End: 2025-01-29

## 2025-01-24 RX ORDER — IBUPROFEN 800 MG/1
TABLET, FILM COATED ORAL
Status: COMPLETED
Start: 2025-01-24 | End: 2025-01-24

## 2025-01-24 RX ORDER — FERROUS SULFATE 325(65) MG
325 TABLET ORAL DAILY
Status: DISCONTINUED | OUTPATIENT
Start: 2025-01-24 | End: 2025-01-25 | Stop reason: HOSPADM

## 2025-01-24 RX ORDER — IBUPROFEN 800 MG/1
800 TABLET, FILM COATED ORAL EVERY 8 HOURS PRN
Qty: 30 TABLET | Refills: 1 | Status: SHIPPED | OUTPATIENT
Start: 2025-01-24

## 2025-01-24 RX ORDER — FERROUS SULFATE 325(65) MG
325 TABLET ORAL
Qty: 90 TABLET | Refills: 1 | Status: SHIPPED | OUTPATIENT
Start: 2025-01-24

## 2025-01-24 RX ADMIN — FERROUS SULFATE TAB 325 MG (65 MG ELEMENTAL FE) 325 MG: 325 (65 FE) TAB at 17:14

## 2025-01-24 RX ADMIN — OXYCODONE 5 MG: 5 TABLET ORAL at 13:47

## 2025-01-24 RX ADMIN — ACETAMINOPHEN 1000 MG: 500 TABLET ORAL at 09:07

## 2025-01-24 RX ADMIN — ACETAMINOPHEN 1000 MG: 500 TABLET ORAL at 17:07

## 2025-01-24 RX ADMIN — IBUPROFEN 800 MG: 800 TABLET, FILM COATED ORAL at 09:11

## 2025-01-24 RX ADMIN — IBUPROFEN 800 MG: 800 TABLET, FILM COATED ORAL at 17:08

## 2025-01-24 RX ADMIN — IBUPROFEN 800 MG: 800 TABLET ORAL at 09:11

## 2025-01-24 RX ADMIN — LEVOTHYROXINE SODIUM 100 MCG: 0.1 TABLET ORAL at 05:39

## 2025-01-24 RX ADMIN — SENNOSIDES AND DOCUSATE SODIUM 1 TABLET: 50; 8.6 TABLET ORAL at 09:11

## 2025-01-24 RX ADMIN — Medication 1 TABLET: at 09:11

## 2025-01-24 RX ADMIN — OXYCODONE 5 MG: 5 TABLET ORAL at 06:03

## 2025-01-24 ASSESSMENT — PAIN DESCRIPTION - DESCRIPTORS
DESCRIPTORS: SORE
DESCRIPTORS: BURNING
DESCRIPTORS: SORE
DESCRIPTORS: TENDER;ACHING

## 2025-01-24 ASSESSMENT — PAIN DESCRIPTION - LOCATION
LOCATION: ABDOMEN;INCISION
LOCATION: ABDOMEN

## 2025-01-24 ASSESSMENT — PAIN - FUNCTIONAL ASSESSMENT
PAIN_FUNCTIONAL_ASSESSMENT: ACTIVITIES ARE NOT PREVENTED
PAIN_FUNCTIONAL_ASSESSMENT: ACTIVITIES ARE NOT PREVENTED

## 2025-01-24 ASSESSMENT — PAIN SCALES - GENERAL
PAINLEVEL_OUTOF10: 3
PAINLEVEL_OUTOF10: 3
PAINLEVEL_OUTOF10: 5
PAINLEVEL_OUTOF10: 5

## 2025-01-24 ASSESSMENT — PAIN DESCRIPTION - ORIENTATION
ORIENTATION: LOWER
ORIENTATION: MID;LOWER
ORIENTATION: LOWER
ORIENTATION: LOWER

## 2025-01-24 NOTE — DISCHARGE INSTRUCTIONS
POST DELIVERY DISCHARGE INSTRUCTIONS    Name: Lia May  YOB: 1990  Primary Diagnosis: [unfilled]    General:     Diet/Diet Restrictions:  Eight 8-ounce glasses of fluid daily (water, juices); avoid excessive caffeine intake.  Meals/snacks as desired which are high in fiber and carbohydrates and low in fat and cholesterol.    Medications:   {Medication reconciliation information is now added to the patient's AVS automatically when it is printed.  There is no need to use this SmartLink in discharge instructions.  Highlight this text and delete it to clear this message}      Physical Activity / Restrictions / Safety:     Avoid heavy lifting, no more that 8 lbs. For 2-3 weeks; No driving while taking narcotic pain medication. Post  patients should not drive until pain free.  No intercourse 4-6 weeks, no douching or tampon use. May resume exercise in 6 weeks.         Discharge Instructions/Special Treatment/Home Care Needs:     Continue prenatal vitamins.  Continue to use squirt bottle with warm water on your episiotomy after each bathroom use until bleeding stops.  If steri-strips applied to your incision, remove in 7 days.  Take stool softeners daily.    Call your doctor for the following:     Fever over 101 degrees by mouth.  Vaginal bleeding heavier than a normal menstrual period or lost larger than a golf ball.  Red streaks or increased swelling of legs, painful red streaks on your breast.  Painful urination, or increased pain, redness or discharge with your incision.    Pain Management:     Pain Management:   Take Acetaminophen (Tylenol) or Ibuprofen (Advil, Motrin), as directed for pain. Use a warm Sitz bath 3 times daily to relieve episiotomy or hemorrhoidal discomfort. Heating pad to  incision as needed. For hemorrhoidal discomfort, use Tucks and Anusol cream as needed and directed.    Follow-Up Care:     Pt to scheduled follow-up appt in 6 weeks    Telephone number:

## 2025-01-24 NOTE — PROGRESS NOTES
Post-Operative Day Number 1 Progress Note    Patient doing well post-op day 1 from  delivery without significant complaints.  Pain controlled on current medication.  Voiding without difficulty, normal lochia.    Vitals:  Patient Vitals for the past 8 hrs:   BP Temp Temp src Pulse Resp SpO2   25 1412 109/73 98.4 °F (36.9 °C) Oral 74 16 96 %   25 1003 107/69 98.4 °F (36.9 °C) Oral 70 16 96 %     Temp (24hrs), Av °F (36.7 °C), Min:97.3 °F (36.3 °C), Max:99.1 °F (37.3 °C)      Vital signs stable, afebrile.    Exam:  Patient without distress.               Abdomen soft, fundus firm at level of umbilicus, nontender.                 Incision dry and clean without erythema.               Lower extremities are negative for swelling, cords or tenderness.    Labs:   Recent Results (from the past 24 hour(s))   CBC    Collection Time: 25  5:48 AM   Result Value Ref Range    WBC 5.8 3.6 - 11.0 K/uL    RBC 2.71 (L) 3.80 - 5.20 M/uL    Hemoglobin 8.6 (L) 11.5 - 16.0 g/dL    Hematocrit 25.4 (L) 35.0 - 47.0 %    MCV 93.7 80.0 - 99.0 FL    MCH 31.7 26.0 - 34.0 PG    MCHC 33.9 30.0 - 36.5 g/dL    RDW 12.3 11.5 - 14.5 %    Platelets 129 (L) 150 - 400 K/uL    MPV 9.2 8.9 - 12.9 FL    Nucleated RBCs 0.0 0  WBC    nRBC 0.00 0.00 - 0.01 K/uL       Assessment and Plan:  Patient appears to be having uncomplicated post- course.  Continue routine post-op care and maternal education.      Anemia - presumably due to blood loss. Repeat CBC tomorrow. Start iron. Home Saturday

## 2025-01-24 NOTE — CARE COORDINATION
Care Management Initial Assessment  2025 10:36 AM      Reason for Admission:   Post term pregnancy over 40 weeks [O48.0]  Encounter for induction of labor [Z34.90]  Procedure(s) (LRB):   SECTION (N/A)  1 Day Post-Op    Patient Admission Status: Inpatient  RUR: Readmission Risk Score: 2.6    Transition of Care Plan:  CM met with patient and spouse/FOB, Arnulfo May, to complete initial assessment.  Charted address and contact information were confirmed.  This is their second child.  They also have a son who is 21 months.  Patient reports having a good support system to include her parents.  She works and will return when her baby is 4 months old.   has been arranged.  Patient plans to breast feed and has a breast pump and all the other necessary equipment for her baby including a car seat.  Pediatric care will be provided by [facility].       25 1034   Service Assessment   History Provided By Patient   Accompanied By/Relationship Arnulfo May, spouse/FOB   Support Systems Spouse/Significant Other;Parent   Family able to assist with home care needs: Yes   Social/Functional History   Lives With Spouse;Son   Discharge Planning   Living Arrangements Spouse/Significant Other;Children   Current Services Prior To Admission None   Potential Assistance Needed N/A   Services At/After Discharge   Confirm Follow Up Transport Family       Gloria Jones  Case Management Department  For questions or concerns, please Perfect[employer]erve

## 2025-01-24 NOTE — LACTATION NOTE
This is mother's second baby to breastfeed. She breast fed her first baby for a few weeks then stopped due to low milk production. She did try teas and herbs. Her new baby has been cluster feeding and has good output.    Discussed with mother her plan for feeding.  Reviewed the benefits of exclusive breast milk feeding during the hospital stay.   Informed her of the risks of using formula to supplement in the first few days of life as well as the benefits of successful breast milk feeding; referred her to the Breastfeeding booklet about this information.   She acknowledges understanding of information reviewed and states that it is her plan to breastfeed her infant.  Will support her choice and offer additional information as needed.       Encouraged mom to attempt feeding with baby led feeding cues. Just as sucking on fingers, rooting, mouthing.   Looking for 8-12 feedings in 24 hours.   Don't limit baby at breast, allow baby to come of breast on it's own. Baby may want to feed  often and may increase number of feedings on second day of life. Skin to skin encouraged.      If baby doesn't nurse,  Mom should  hand express  10-20 drops of colostrum and drip into baby's mouth, or give to baby by finger feeding, cup feeding, or spoon feeding at least every 2-3 hours.     Discussed eating a healthy diet. Instructed mother to eat a variety of foods in order to get a well balanced diet. She should consume an extra 500 calories per day (more than her non-pregnant requirement.) These extra calories will help provide energy needed for optimal breast milk production. Mother also encouraged to \"drink to thirst\" and it is recommended that she drink fluids such as water, fruit/vegetable juice. Nutritious snacks should be available so that she can eat throughout the day to help satisfy her hunger and maintain a good milk supply.    Pt will successfully establish breastfeeding by feeding in response to early feeding cues   or wake

## 2025-01-24 NOTE — DISCHARGE SUMMARY
Obstetrical Discharge Summary     Name: Lia May MRN: [id number]  SSN: [id number]    YOB: 1990  Age: 35 y.o.  Sex: female      Admit Date: 2025    Discharge Date: 2025     Admitting Physician: Roselia Holm MD     Attending Physician:  Roselia Holm MD     Admission Diagnoses: Post term pregnancy over 40 weeks [O48.0]  Encounter for induction of labor [Z34.90]    Discharge Diagnoses:   Information for the patient's :  Sondra May [[id number]]   @[id number]@     Delivery Type: , Low Transverse   By Delivering Clinician:ROSELIA HOLM   Delivery Date /Time: 2025 3:57 PM '    Additional Diagnoses:  No components found for: \"OBEXTABORH\", \"OBEXTABSCRN\", \"OBEXTRUBELLA\", \"OBEXTGRBS\"    Hospital Course: Normal hospital course following the delivery.    Disposition: Home  Condition: Good    Patient Instructions:   Current Discharge Medication List        START taking these medications    Details   ibuprofen (ADVIL;MOTRIN) 800 MG tablet Take 1 tablet by mouth every 8 hours as needed for Pain  Qty: 30 tablet, Refills: 1      oxyCODONE (ROXICODONE) 5 MG immediate release tablet Take 1 tablet by mouth every 4 hours as needed for Pain for up to 5 days. Intended supply: 3 days. Take lowest dose possible to manage pain Max Daily Amount: 30 mg  Qty: 20 tablet, Refills: 0    Comments: Reduce doses taken as pain becomes manageable  Associated Diagnoses: Post-op pain      !! ferrous sulfate (IRON 325) 325 (65 Fe) MG tablet Take 1 tablet by mouth daily (with breakfast)  Qty: 90 tablet, Refills: 1       !! - Potential duplicate medications found. Please discuss with provider.        CONTINUE these medications which have NOT CHANGED    Details   famotidine (PEPCID) 20 MG tablet Take 1 tablet by mouth daily      !! ferrous sulfate (IRON 325) 325 (65 Fe) MG tablet Take 1 tablet by mouth daily (with breakfast)      levothyroxine (SYNTHROID) 100 MCG tablet Take 1 tablet by  mouth daily  Qty: 90 tablet, Refills: 2      Docosahexaenoic Acid (DHA PO) Take by mouth      Probiotic Product (PROBIOTIC PO) Take by mouth      Prenatal Vit-Fe Fumarate-FA (PRENATAL VITAMIN) 27-0.8 MG TABS Take 1 tablet by mouth daily  Qty: 90 tablet, Refills: 5    Comments: May substitute any prenatal vitamin with iron covered by insurance       !! - Potential duplicate medications found. Please discuss with provider.          Reference my discharge instructions.    Follow-up Information    None           Signed By:  Roselia Holm MD     January 24, 2025

## 2025-01-25 VITALS
BODY MASS INDEX: 31.23 KG/M2 | WEIGHT: 199 LBS | OXYGEN SATURATION: 97 % | TEMPERATURE: 98.8 F | HEART RATE: 75 BPM | RESPIRATION RATE: 18 BRPM | SYSTOLIC BLOOD PRESSURE: 115 MMHG | HEIGHT: 67 IN | DIASTOLIC BLOOD PRESSURE: 84 MMHG

## 2025-01-25 LAB
ERYTHROCYTE [DISTWIDTH] IN BLOOD BY AUTOMATED COUNT: 12.2 % (ref 11.5–14.5)
HCT VFR BLD AUTO: 31.5 % (ref 35–47)
HGB BLD-MCNC: 10.6 G/DL (ref 11.5–16)
MCH RBC QN AUTO: 31.5 PG (ref 26–34)
MCHC RBC AUTO-ENTMCNC: 33.7 G/DL (ref 30–36.5)
MCV RBC AUTO: 93.8 FL (ref 80–99)
NRBC # BLD: 0 K/UL (ref 0–0.01)
NRBC BLD-RTO: 0 PER 100 WBC
PLATELET # BLD AUTO: 170 K/UL (ref 150–400)
PMV BLD AUTO: 9.2 FL (ref 8.9–12.9)
RBC # BLD AUTO: 3.36 M/UL (ref 3.8–5.2)
WBC # BLD AUTO: 7.5 K/UL (ref 3.6–11)

## 2025-01-25 PROCEDURE — 85027 COMPLETE CBC AUTOMATED: CPT

## 2025-01-25 PROCEDURE — 36415 COLL VENOUS BLD VENIPUNCTURE: CPT

## 2025-01-25 PROCEDURE — 6370000000 HC RX 637 (ALT 250 FOR IP): Performed by: OBSTETRICS & GYNECOLOGY

## 2025-01-25 RX ADMIN — FERROUS SULFATE TAB 325 MG (65 MG ELEMENTAL FE) 325 MG: 325 (65 FE) TAB at 08:06

## 2025-01-25 RX ADMIN — LEVOTHYROXINE SODIUM 100 MCG: 0.1 TABLET ORAL at 08:06

## 2025-01-25 RX ADMIN — ACETAMINOPHEN 1000 MG: 500 TABLET ORAL at 02:00

## 2025-01-25 RX ADMIN — IBUPROFEN 800 MG: 800 TABLET, FILM COATED ORAL at 09:52

## 2025-01-25 RX ADMIN — IBUPROFEN 800 MG: 800 TABLET, FILM COATED ORAL at 02:00

## 2025-01-25 RX ADMIN — ACETAMINOPHEN 1000 MG: 500 TABLET ORAL at 09:51

## 2025-01-25 RX ADMIN — Medication 1 TABLET: at 08:05

## 2025-01-25 RX ADMIN — SENNOSIDES AND DOCUSATE SODIUM 1 TABLET: 50; 8.6 TABLET ORAL at 08:05

## 2025-01-25 RX ADMIN — OXYCODONE 5 MG: 5 TABLET ORAL at 11:36

## 2025-01-25 ASSESSMENT — PAIN - FUNCTIONAL ASSESSMENT: PAIN_FUNCTIONAL_ASSESSMENT: ACTIVITIES ARE NOT PREVENTED

## 2025-01-25 ASSESSMENT — PAIN DESCRIPTION - DESCRIPTORS
DESCRIPTORS: ACHING
DESCRIPTORS: ACHING;SORE
DESCRIPTORS: ACHING;SORE

## 2025-01-25 ASSESSMENT — PAIN DESCRIPTION - ORIENTATION
ORIENTATION: LOWER

## 2025-01-25 ASSESSMENT — PAIN DESCRIPTION - LOCATION
LOCATION: INCISION
LOCATION: ABDOMEN;INCISION
LOCATION: ABDOMEN;INCISION

## 2025-01-25 ASSESSMENT — PAIN SCALES - GENERAL
PAINLEVEL_OUTOF10: 3
PAINLEVEL_OUTOF10: 5
PAINLEVEL_OUTOF10: 6

## 2025-01-25 NOTE — LACTATION NOTE
This note was copied from a baby's chart.     25 0900   Visit Information   Lactation Consult Visit Type IP Consult Follow Up   Visit Length 30 minutes   Referral Received From Lactation Consultant Follow-up   Reason for Visit Education;Normal [place] Visit;Sore Nipples   Breast Feeding History/Assessment   Left Breast Soft   Left Nipple Protrude   Right Nipple Protrude   Right Breast Soft   Breastfeeding History Yes   Longest duration (#) 6   Longest Duration weeks   Complications Yes (Comment)  (low supply)   Care Plan/Breast Care   Breast Care Using breast pump;Lanolin provided   Lactation Comment Lactation education and support     Mother reports breastfeeding is going well, however she has sore nipples due to baby cluster feeding. Mother reports that latch is comfortable. No nipple breakdown or irritation noted. Mother supplied with lanolin cream and recommended colostrum for healing. Mother is supplementing with formula until she grows her supply. I&O is adequate.  oral assessment is WDL. Recommended mother to initiate pumping with her Spectra after feeds for stimulation/supply while supplementing. Measured for flange size. All questions addressed. Mother to follow up with community LC if she needs, resources provided.    Pt will successfully establish breastfeeding by feeding in response to early feeding cues   or wake every 3h, will obtain deep latch, and will keep log of feedings/output.  Taught to BF at hunger cues and or q 2-3 hrs and to offer 10-20 drops of hand expressed colostrum at any non-feeds.      Showed mom how to hand express and explained benefits of hand expression for milk production and feeding baby.    Discussed eating a healthy diet. Instructed mother to eat a variety of foods in order to get a well balanced diet. She should consume an extra 500 calories per day (more than her non-pregnant requirement.) These extra calories will help provide energy needed for optimal breast

## 2025-01-25 NOTE — PROGRESS NOTES
Postpartum Note:    Hx: Doing well.  Lochia normal.  Pain controlled.  No dizziness.  Breastfeeding.      Exam:   BP Readings from Last 3 Encounters:   25 113/74   25 118/77   25 112/76      GEN: NAD, lying in bed comfortably  ABD: Soft, NT, UFF, incision C/D/I  EXT: no calf TTP, no erythema or palpable cords    Recent Results (from the past 24 hour(s))   CBC    Collection Time: 25  5:48 AM   Result Value Ref Range    WBC 5.8 3.6 - 11.0 K/uL    RBC 2.71 (L) 3.80 - 5.20 M/uL    Hemoglobin 8.6 (L) 11.5 - 16.0 g/dL    Hematocrit 25.4 (L) 35.0 - 47.0 %    MCV 93.7 80.0 - 99.0 FL    MCH 31.7 26.0 - 34.0 PG    MCHC 33.9 30.0 - 36.5 g/dL    RDW 12.3 11.5 - 14.5 %    Platelets 129 (L) 150 - 400 K/uL    MPV 9.2 8.9 - 12.9 FL    Nucleated RBCs 0.0 0  WBC    nRBC 0.00 0.00 - 0.01 K/uL       Assessment/Plan:   POD2 s/p repeat CS  Meeting milestones  Continue routine postpartum care   Encourage ambulation and direct breastfeeding  Continue iron for anemia, asymptomatic, hb this morning 10.6 (from 8.6 )  Ok for early d/c home    Funmi Odonnell DO 25

## 2025-01-25 NOTE — PROGRESS NOTES
Patient discharged to home, education complete. Patient stated understanding with all questions answered.  Prescriptions: Iron, Motrin and Oxycodone sent to pharmacy by MD.

## 2025-02-21 ENCOUNTER — TELEPHONE (OUTPATIENT)
Age: 35
End: 2025-02-21

## 2025-02-21 NOTE — TELEPHONE ENCOUNTER
Two patient identifiers used        35 year old patient delivered on 2025 by .    Patient reports  incision is fine.     Patient calling to report for the past 1.5 weeks, vaginal odor, orange like color vaginal discharge and discomfort with urination.    Patient was calling to get placed ton the schedule to be seen,    Patient placed on the schedule to be seen on 25 at 3:00 pm ( ok per MC) Patient ok to wait till Monday.      Patient was suggested to wash vaginal area with plain warm water, wear lose fitting clothes and cotton underwear/;        Patient verbalized understanding

## 2025-02-24 ENCOUNTER — OFFICE VISIT (OUTPATIENT)
Age: 35
End: 2025-02-24
Payer: COMMERCIAL

## 2025-02-24 VITALS
DIASTOLIC BLOOD PRESSURE: 73 MMHG | SYSTOLIC BLOOD PRESSURE: 109 MMHG | HEIGHT: 67 IN | RESPIRATION RATE: 16 BRPM | OXYGEN SATURATION: 99 % | BODY MASS INDEX: 27.94 KG/M2 | WEIGHT: 178 LBS

## 2025-02-24 DIAGNOSIS — N89.8 VAGINAL DISCHARGE: ICD-10-CM

## 2025-02-24 DIAGNOSIS — R30.0 DYSURIA: ICD-10-CM

## 2025-02-24 LAB
BILIRUBIN, URINE, POC: NEGATIVE
BLOOD URINE, POC: NORMAL
GLUCOSE URINE, POC: NEGATIVE
KETONES, URINE, POC: NEGATIVE
LEUKOCYTE ESTERASE, URINE, POC: NEGATIVE
NITRITE, URINE, POC: NEGATIVE
PH, URINE, POC: 5.5 (ref 4.6–8)
PROTEIN,URINE, POC: NORMAL
SPECIFIC GRAVITY, URINE, POC: 1.03 (ref 1–1.03)
URINALYSIS CLARITY, POC: NORMAL
URINALYSIS COLOR, POC: NORMAL
UROBILINOGEN, POC: NORMAL

## 2025-02-24 PROCEDURE — 81003 URINALYSIS AUTO W/O SCOPE: CPT | Performed by: OBSTETRICS & GYNECOLOGY

## 2025-02-24 PROCEDURE — 0503F POSTPARTUM CARE VISIT: CPT | Performed by: OBSTETRICS & GYNECOLOGY

## 2025-02-24 NOTE — PROGRESS NOTES
OB/GYN Problem VIsit    HPI  Lia May is a ,  35 y.o. female who presents for a problem visit and postpartum visit. Baby doing well. Hx of infertility.  Complains of dysuria and off and on vaginal irritation sometimes with odor. Had repeat CS. Not SA       No LMP recorded.  Birth Control: abstinence.  Last Pap: Normal, HPV Negative obtained 23            Past Medical History:   Diagnosis Date    Anemia     Anxiety     Autoimmune disorder 2009    Hashimoto's thyroiditis     Hypertension affecting pregnancy 2023    Hypothyroidism 2009    Infertility, female 2017    Pre-eclampsia 2023    Routine Papanicolaou smear 2023    NILM, HPV negative     Past Surgical History:   Procedure Laterality Date     SECTION       SECTION N/A 2025     SECTION performed by Roselia Holm MD at [facility]     Social History     Occupational History    Not on file   Tobacco Use    Smoking status: Never    Smokeless tobacco: Never   Vaping Use    Vaping status: Not on file   Substance and Sexual Activity    Alcohol use: Not Currently    Drug use: Never    Sexual activity: Yes     Partners: Male     Birth control/protection: None     Comment: Spouse     Family History   Problem Relation Age of Onset    Heart Attack Father     Other Father     Other Maternal Grandmother     Other Maternal Grandfather     Stroke Maternal Grandfather     Heart Disease Maternal Grandfather     Hypertension Mother     Diabetes Paternal Grandmother     Anxiety Disorder Paternal Grandmother     Other Paternal Grandfather     Cancer Paternal Grandfather        No Known Allergies  Prior to Admission medications    Medication Sig Start Date End Date Taking? Authorizing Provider   ibuprofen (ADVIL;MOTRIN) 800 MG tablet Take 1 tablet by mouth every 8 hours as needed for Pain 25  Yes Roselia Holm MD   ferrous sulfate (IRON 325) 325 (65 Fe) MG tablet Take 1 tablet by mouth daily (with

## 2025-02-24 NOTE — PROGRESS NOTES
Lia May is a 35 y.o. female presents for a problem visit.    No chief complaint on file.    No LMP recorded.  Birth Control: {contraception:041285}.  Last Pap: Normal, HPV Negative obtained 1/30/23    The patient is reporting having: Dysuria/dischargefor 1.5 weeks.  She reports the symptoms are {BETTER/WORSE:19226}.  Aggravating factors include ***.  And alleviating factors include ***.        1. Have you been to the ER, urgent care clinic, or hospitalized since your last visit? {YES/NO:19726::\"Yes- When: ***. Where: ***.  Reason for visit: ***\"}    2. Have you seen or consulted any other health care providers outside of the [facility] since your last visit? {YES/NO:19726::\"Yes- When: ***. Where: ***.  Reason for visit: ***\"}    Examination chaperoned by Araceli Flores LPN.

## 2025-02-24 NOTE — PROGRESS NOTES
Lia May is a 35 y.o. female presents for a problem visit.    Chief Complaint   Patient presents with    Vaginal Discharge    Urinary Burning     No LMP recorded.  Birth Control: abstinence.  Last Pap: Normal, HPV Negative obtained 1/30/23    The patient is reporting having: vaginal odor, orange hued vaginal discharge and discomfort with urination for a few weeks.     1. Have you been to the ER, urgent care clinic, or hospitalized since your last visit? No    2. Have you seen or consulted any other health care providers outside of the [facility] since your last visit? No    Examination chaperoned by NORBERT RASHID LPN.

## 2025-02-27 ENCOUNTER — TELEPHONE (OUTPATIENT)
Age: 35
End: 2025-02-27

## 2025-02-27 LAB
A VAGINAE DNA VAG QL NAA+PROBE: NORMAL SCORE
BACTERIA UR CULT: ABNORMAL
BVAB2 DNA VAG QL NAA+PROBE: NORMAL SCORE
C ALBICANS DNA VAG QL NAA+PROBE: NEGATIVE
C GLABRATA DNA VAG QL NAA+PROBE: NEGATIVE
MEGA1 DNA VAG QL NAA+PROBE: NORMAL SCORE
T VAGINALIS DNA VAG QL NAA+PROBE: NEGATIVE

## 2025-02-27 RX ORDER — CEPHALEXIN 500 MG/1
500 CAPSULE ORAL 4 TIMES DAILY
Qty: 28 CAPSULE | Refills: 0 | Status: SHIPPED | OUTPATIENT
Start: 2025-02-27

## 2025-02-27 NOTE — TELEPHONE ENCOUNTER
PT was called back, name and  verified    RN relayed per , she will be reviewing labs at the end of the day.  RN recommended watching her MC for a message attached to the result.  PT verbalizes understanding.

## 2025-02-27 NOTE — TELEPHONE ENCOUNTER
PT name and  verified    34 yo last ov/pp fu 25, RCS 25, next ov/ae 25    PT calling stating her results came back from the other day ov  and she knows that the MD has not seen them, but she really needs them looked at.  RN relayed since MD has not reviewed nothing can be discussed until reviewed.  PT voices understanding and asks to send message to MD for review.    Please review  Thank you

## 2025-02-28 NOTE — TELEPHONE ENCOUNTER
PT was called back, name and  verified    RN informed PT call back was to make sure she saw the message from MD, and that she sent a medication in for her symptoms.  PT verbalizes understanding and states she did say and she has already started taking the abx.

## 2025-02-28 NOTE — TELEPHONE ENCOUNTER
PT was called back, no answer, left generic vm to call office back after 830 am, and if she could view her MC for a message from the MD.

## 2025-07-17 ENCOUNTER — TELEPHONE (OUTPATIENT)
Age: 35
End: 2025-07-17

## 2025-07-17 RX ORDER — FERROUS SULFATE 325(65) MG
1 TABLET ORAL
Qty: 90 TABLET | Refills: 0 | Status: SHIPPED | OUTPATIENT
Start: 2025-07-17

## 2025-07-17 RX ORDER — LEVOTHYROXINE SODIUM 100 UG/1
100 TABLET ORAL DAILY
Qty: 90 TABLET | Refills: 0 | Status: SHIPPED | OUTPATIENT
Start: 2025-07-17

## 2025-07-17 NOTE — TELEPHONE ENCOUNTER
36 yo last ov/pp fu 2/24/25, next ov/ae 8/25/25  PT cx'd ae dates of 6/25/25 and 7/15/25    PT had RCS 1/23/25    Rx ferrous sulfate (IRON 325) last sent 1/24/25, CBC 1/24/25:  Hemoglobin 8.6 Low        Rx levothyroxine (SYNTHROID) 100 MCG tablet last sent 10/10/24.    Rx's pended for  to review for refills and ammend refill amount. PT has rescheduled ae for 8/25/25.    Thank you

## 2025-08-25 ENCOUNTER — OFFICE VISIT (OUTPATIENT)
Age: 35
End: 2025-08-25
Payer: COMMERCIAL

## 2025-08-25 VITALS — SYSTOLIC BLOOD PRESSURE: 112 MMHG | BODY MASS INDEX: 25.53 KG/M2 | DIASTOLIC BLOOD PRESSURE: 80 MMHG | WEIGHT: 163 LBS

## 2025-08-25 DIAGNOSIS — Z12.4 CERVICAL CANCER SCREENING: ICD-10-CM

## 2025-08-25 DIAGNOSIS — Z01.419 ENCOUNTER FOR GYNECOLOGICAL EXAMINATION (GENERAL) (ROUTINE) WITHOUT ABNORMAL FINDINGS: Primary | ICD-10-CM

## 2025-08-25 DIAGNOSIS — Z01.419 WELL WOMAN EXAM: ICD-10-CM

## 2025-08-25 PROCEDURE — 99395 PREV VISIT EST AGE 18-39: CPT | Performed by: OBSTETRICS & GYNECOLOGY

## 2025-08-25 PROCEDURE — 99459 PELVIC EXAMINATION: CPT | Performed by: OBSTETRICS & GYNECOLOGY

## 2025-08-30 LAB
CYTOLOGIST CVX/VAG CYTO: NORMAL
CYTOLOGY CVX/VAG DOC CYTO: NORMAL
CYTOLOGY CVX/VAG DOC THIN PREP: NORMAL
DX ICD CODE: NORMAL
HPV GENOTYPE REFLEX: NORMAL
HPV I/H RISK 4 DNA CVX QL PROBE+SIG AMP: NEGATIVE
Lab: NORMAL
OTHER STN SPEC: NORMAL
SERVICE CMNT-IMP: NORMAL
STAT OF ADQ CVX/VAG CYTO-IMP: NORMAL

## (undated) DEVICE — C-SECTION II-LF: Brand: MEDLINE INDUSTRIES, INC.

## (undated) DEVICE — CATHETER URIN 16FR 30CC BLLN 2 W F LUBRI-SIL IC

## (undated) DEVICE — Z DUP USE 2271313 SOLIDIFIER FLUID 3000 CC ABSORB

## (undated) DEVICE — CONNEXT SURGICAL MATRIX - LARGE SYRINGE: Brand: CONNEXT SURGICAL MATRIX

## (undated) DEVICE — Z DISCONTINUED NO SUB IDED SPONGE LAPAROTOMY W18XL18IN WHITE STRUNG RADIOPAQUE STERILE

## (undated) DEVICE — STERILE LATEX POWDER-FREE SURGICAL GLOVESWITH NITRILE COATING: Brand: PROTEXIS

## (undated) DEVICE — CLEANER ES TIP W2XL2IN ADH BK RADPQ FOR S STL ELECTRD

## (undated) DEVICE — STAPLER SKIN H3.9MM WIRE DIA0.58MM CRWN 6.9MM 35 STPL ROT

## (undated) DEVICE — 3000CC GUARDIAN II: Brand: GUARDIAN

## (undated) DEVICE — POOLE SUCTION INSTRUMENT WITH REMOVABLE SHEATH: Brand: POOLE

## (undated) DEVICE — SUTURE PDS II SZ 1 L36IN ABSRB VLT CT L40MM 1/2 CIR TAPR Z359T

## (undated) DEVICE — SOLUTION IV 1000ML 0.9% SOD CHL

## (undated) DEVICE — TOWEL,OR,DSP,ST,BLUE,STD,2/PK,40PK/CS: Brand: MEDLINE

## (undated) DEVICE — SYRINGE IRRIG 60ML SFT PLIABLE BLB EZ TO GRP 1 HND USE W/

## (undated) DEVICE — 3M™ MEDIPORE™ H SOFT CLOTH SURGICAL TAPE, 2863, 3 IN X 10 YD, 12/CASE: Brand: 3M™ MEDIPORE™

## (undated) DEVICE — TRAY PREP DRY W/ PREM GLV 2 APPL 6 SPNG 2 UNDPD 1 OVERWRAP

## (undated) DEVICE — BLADE CLIPPER GEN PURP NS

## (undated) DEVICE — TRAY,URINE METER,100% SILICONE,16FR10ML: Brand: MEDLINE

## (undated) DEVICE — GARMENT,MEDLINE,DVT,INT,CALF,MED, GEN2: Brand: MEDLINE

## (undated) DEVICE — CONNEXT SURGICAL MATRIX - SMALL SYRINGE: Brand: CONNEXT SURGICAL MATRIX

## (undated) DEVICE — ELECTRODE PT RET AD L9FT HI MOIST COND ADH HYDRGEL CORDED

## (undated) DEVICE — Z INACTIVE NO ACTIVE SUPPLIER APPLICATOR MEDICATED 26 CC TINT HI-LITE ORNG STRL CHLORAPREP

## (undated) DEVICE — Z DISCONTINUED USE 2220179 SUTURE VCRL SZ 0 L36IN ABSRB VLT L40MM CT 1/2 CIR J358H

## (undated) DEVICE — INTENT OT USE PROVIDES A STERILE INTERFACE BETWEEN THE OPERATING ROOM SURGICAL LAMPS (NON-STERILE) AND THE SURGEON OR STAFF WORKING IN THE STERILE FIELD.: Brand: ASPEN® ALC PLUS LIGHT HANDLE COVER

## (undated) DEVICE — DRESSING BORDERED ADH GZ UNIV GEN USE 8INX4IN AND 6INX2IN

## (undated) DEVICE — PENCIL ES L3M ROCK SWCH S STL HEX LOK BLDE ELECTRD HOLSTER